# Patient Record
Sex: MALE | Race: WHITE | Employment: FULL TIME | URBAN - METROPOLITAN AREA
[De-identification: names, ages, dates, MRNs, and addresses within clinical notes are randomized per-mention and may not be internally consistent; named-entity substitution may affect disease eponyms.]

---

## 2017-01-25 ENCOUNTER — HOSPITAL ENCOUNTER (OUTPATIENT)
Dept: PHYSICAL THERAPY | Age: 27
Discharge: HOME | End: 2017-01-31
Attending: FAMILY MEDICINE | Admitting: FAMILY MEDICINE

## 2017-01-25 ASSESSMENT — PAIN DESCRIPTION - PAIN TYPE: TYPE: CHRONIC PAIN

## 2017-01-25 ASSESSMENT — PAIN SCALES - GENERAL: PAINLEVEL_OUTOF10: 2

## 2017-01-25 ASSESSMENT — PAIN DESCRIPTION - FREQUENCY: FREQUENCY: CONTINUOUS

## 2017-01-25 ASSESSMENT — PAIN DESCRIPTION - DESCRIPTORS: DESCRIPTORS: ACHING

## 2017-01-25 ASSESSMENT — PAIN DESCRIPTION - LOCATION: LOCATION: BACK

## 2017-01-25 ASSESSMENT — PAIN DESCRIPTION - ORIENTATION: ORIENTATION: LEFT;RIGHT

## 2017-02-01 ENCOUNTER — HOSPITAL ENCOUNTER (OUTPATIENT)
Dept: OTHER | Age: 27
Discharge: HOME | End: 2017-02-28
Attending: FAMILY MEDICINE | Admitting: FAMILY MEDICINE

## 2017-02-14 ENCOUNTER — HOSPITAL ENCOUNTER (OUTPATIENT)
Dept: GENERAL RADIOLOGY | Age: 27
Discharge: HOME | End: 2017-02-14
Attending: FAMILY MEDICINE | Admitting: FAMILY MEDICINE

## 2017-02-14 DIAGNOSIS — M25.561 RIGHT KNEE PAIN, UNSPECIFIED CHRONICITY: ICD-10-CM

## 2017-04-25 PROBLEM — E78.1 HYPERTRIGLYCERIDEMIA: Status: ACTIVE | Noted: 2017-04-25

## 2017-08-11 ENCOUNTER — HOSPITAL ENCOUNTER (OUTPATIENT)
Dept: GENERAL RADIOLOGY | Age: 27
Discharge: HOME | End: 2017-08-11
Attending: FAMILY MEDICINE | Admitting: FAMILY MEDICINE

## 2017-08-11 DIAGNOSIS — M19.90 ACUTE ARTHRITIS: ICD-10-CM

## 2017-08-11 LAB
ALBUMIN SERPL-MCNC: 4.6 GM/DL (ref 3.4–5)
ALP BLD-CCNC: 81 IU/L (ref 40–129)
ALT SERPL-CCNC: 77 U/L (ref 10–40)
AST SERPL-CCNC: 37 IU/L (ref 15–37)
BILIRUB SERPL-MCNC: 0.5 MG/DL (ref 0–1)
BILIRUBIN DIRECT: 0.2 MG/DL (ref 0–0.3)
BILIRUBIN, INDIRECT: 0.3 MG/DL (ref 0–0.7)
TOTAL PROTEIN: 7.6 GM/DL (ref 6.4–8.2)

## 2018-03-22 ENCOUNTER — HOSPITAL ENCOUNTER (OUTPATIENT)
Dept: GENERAL RADIOLOGY | Age: 28
Discharge: HOME | End: 2018-03-22
Attending: FAMILY MEDICINE | Admitting: FAMILY MEDICINE

## 2018-03-22 DIAGNOSIS — M54.5 LOW BACK PAIN, UNSPECIFIED BACK PAIN LATERALITY, UNSPECIFIED CHRONICITY, WITH SCIATICA PRESENCE UNSPECIFIED: ICD-10-CM

## 2018-03-22 LAB
ALBUMIN SERPL-MCNC: 4.6 GM/DL (ref 3.4–5)
ALBUMIN SERPL-MCNC: 4.6 GM/DL (ref 3.4–5)
ALP BLD-CCNC: 100 IU/L (ref 40–128)
ALP BLD-CCNC: 100 IU/L (ref 40–129)
ALT SERPL-CCNC: 59 U/L (ref 10–40)
ALT SERPL-CCNC: 59 U/L (ref 10–40)
ANION GAP SERPL CALCULATED.3IONS-SCNC: 14 MMOL/L (ref 4–16)
AST SERPL-CCNC: 35 IU/L (ref 15–37)
AST SERPL-CCNC: 35 IU/L (ref 15–37)
BACTERIA: ABNORMAL /HPF
BILIRUB SERPL-MCNC: 0.5 MG/DL (ref 0–1)
BILIRUB SERPL-MCNC: 0.5 MG/DL (ref 0–1)
BILIRUBIN DIRECT: 0.2 MG/DL (ref 0–0.3)
BILIRUBIN URINE: NEGATIVE MG/DL
BILIRUBIN, INDIRECT: 0.3 MG/DL (ref 0–0.7)
BLOOD, URINE: NEGATIVE
BUN BLDV-MCNC: 8 MG/DL (ref 6–23)
CALCIUM SERPL-MCNC: 9.6 MG/DL (ref 8.3–10.6)
CHLORIDE BLD-SCNC: 99 MMOL/L (ref 99–110)
CHOLESTEROL: 135 MG/DL
CLARITY: CLEAR
CO2: 24 MMOL/L (ref 21–32)
COLOR: ABNORMAL
CREAT SERPL-MCNC: 1.2 MG/DL (ref 0.9–1.3)
GFR AFRICAN AMERICAN: >60 ML/MIN/1.73M2
GFR NON-AFRICAN AMERICAN: >60 ML/MIN/1.73M2
GLUCOSE FASTING: 131 MG/DL (ref 70–99)
GLUCOSE, URINE: NEGATIVE MG/DL
HCT VFR BLD CALC: 48 % (ref 42–52)
HDLC SERPL-MCNC: 33 MG/DL
HEMOGLOBIN: 17.2 GM/DL (ref 13.5–18)
KETONES, URINE: NEGATIVE MG/DL
LDL CHOLESTEROL DIRECT: 86 MG/DL
LEUKOCYTE ESTERASE, URINE: NEGATIVE
MAGNESIUM: 1.9 MG/DL (ref 1.8–2.4)
MCH RBC QN AUTO: 31.7 PG (ref 27–31)
MCHC RBC AUTO-ENTMCNC: 35.8 % (ref 32–36)
MCV RBC AUTO: 88.4 FL (ref 78–100)
NITRITE URINE, QUANTITATIVE: NEGATIVE
PDW BLD-RTO: 12.8 % (ref 11.7–14.9)
PH, URINE: 6 (ref 5–8)
PLATELET # BLD: 293 K/CU MM (ref 140–440)
PMV BLD AUTO: 10.3 FL (ref 7.5–11.1)
POTASSIUM SERPL-SCNC: 4.3 MMOL/L (ref 3.5–5.1)
PROTEIN UA: NEGATIVE MG/DL
RBC # BLD: 5.43 M/CU MM (ref 4.6–6.2)
RBC URINE: 1 /HPF (ref 0–3)
SODIUM BLD-SCNC: 137 MMOL/L (ref 135–145)
SPECIFIC GRAVITY UA: 1.01 (ref 1–1.03)
SQUAMOUS EPITHELIAL: <1 /HPF
T4 FREE: 1.38 NG/DL (ref 0.9–1.8)
TOTAL PROTEIN: 7.7 GM/DL (ref 6.4–8.2)
TOTAL PROTEIN: 7.7 GM/DL (ref 6.4–8.2)
TRICHOMONAS: ABNORMAL /HPF
TRIGL SERPL-MCNC: 166 MG/DL
TSH HIGH SENSITIVITY: 2.23 UIU/ML (ref 0.27–4.2)
UROBILINOGEN, URINE: NORMAL MG/DL (ref 0.2–1)
VITAMIN D 25-HYDROXY: 24.73 NG/ML
WBC # BLD: 10.7 K/CU MM (ref 4–10.5)
WBC UA: 2 /HPF (ref 0–2)

## 2018-03-23 LAB
ESTIMATED AVERAGE GLUCOSE: 117 MG/DL
HBA1C MFR BLD: 5.7 % (ref 4.2–6.3)

## 2018-05-01 ENCOUNTER — HOSPITAL ENCOUNTER (OUTPATIENT)
Dept: GENERAL RADIOLOGY | Age: 28
Discharge: HOME | End: 2018-05-01
Attending: FAMILY MEDICINE | Admitting: FAMILY MEDICINE

## 2018-05-01 DIAGNOSIS — R10.11 ABDOMINAL PAIN, RIGHT UPPER QUADRANT: ICD-10-CM

## 2018-05-04 ENCOUNTER — HOSPITAL ENCOUNTER (OUTPATIENT)
Dept: NUCLEAR MEDICINE | Age: 28
Discharge: HOME | End: 2018-05-04
Attending: FAMILY MEDICINE | Admitting: FAMILY MEDICINE

## 2018-05-04 VITALS — BODY MASS INDEX: 39.8 KG/M2 | WEIGHT: 310 LBS

## 2018-05-04 DIAGNOSIS — R10.11 RUQ PAIN: ICD-10-CM

## 2018-05-04 RX ADMIN — Medication 6 MILLICURIE: at 09:00

## 2018-05-15 ENCOUNTER — TELEPHONE (OUTPATIENT)
Dept: SURGERY | Age: 28
End: 2018-05-15

## 2018-05-24 ENCOUNTER — OFFICE VISIT (OUTPATIENT)
Dept: SURGERY | Age: 28
End: 2018-05-24

## 2018-05-24 VITALS
BODY MASS INDEX: 39.78 KG/M2 | SYSTOLIC BLOOD PRESSURE: 112 MMHG | WEIGHT: 309.97 LBS | HEIGHT: 74 IN | HEART RATE: 85 BPM | DIASTOLIC BLOOD PRESSURE: 84 MMHG

## 2018-05-24 DIAGNOSIS — K80.10 CCC (CHRONIC CALCULOUS CHOLECYSTITIS): Primary | ICD-10-CM

## 2018-05-24 PROCEDURE — G8427 DOCREV CUR MEDS BY ELIG CLIN: HCPCS | Performed by: SURGERY

## 2018-05-24 PROCEDURE — 4004F PT TOBACCO SCREEN RCVD TLK: CPT | Performed by: SURGERY

## 2018-05-24 PROCEDURE — 99203 OFFICE O/P NEW LOW 30 MIN: CPT | Performed by: SURGERY

## 2018-05-24 PROCEDURE — G8417 CALC BMI ABV UP PARAM F/U: HCPCS | Performed by: SURGERY

## 2018-05-24 RX ORDER — TOPIRAMATE 25 MG/1
TABLET ORAL 2 TIMES DAILY
COMMUNITY
Start: 2018-02-19 | End: 2020-07-28 | Stop reason: SDUPTHER

## 2018-05-24 RX ORDER — DOCUSATE SODIUM 100 MG/1
100 CAPSULE, LIQUID FILLED ORAL 2 TIMES DAILY
COMMUNITY
End: 2019-04-03

## 2018-05-24 RX ORDER — GEMFIBROZIL 600 MG/1
TABLET, FILM COATED ORAL 2 TIMES DAILY
COMMUNITY
Start: 2018-05-07 | End: 2019-04-03

## 2018-05-24 RX ORDER — DIPHENHYDRAMINE HCL 25 MG
50 TABLET ORAL EVERY 6 HOURS PRN
COMMUNITY
End: 2021-02-02

## 2018-05-24 RX ORDER — IBUPROFEN 800 MG/1
TABLET ORAL
COMMUNITY
Start: 2018-02-19 | End: 2019-09-30

## 2018-05-24 ASSESSMENT — ENCOUNTER SYMPTOMS
APNEA: 0
BLOOD IN STOOL: 1
COLOR CHANGE: 0
EYE REDNESS: 0
STRIDOR: 0
PHOTOPHOBIA: 0
ABDOMINAL PAIN: 1
RECTAL PAIN: 0
BACK PAIN: 0
EYE ITCHING: 0
CHOKING: 0
CONSTIPATION: 0
SORE THROAT: 0
ANAL BLEEDING: 0

## 2018-05-25 ENCOUNTER — TELEPHONE (OUTPATIENT)
Dept: SURGERY | Age: 28
End: 2018-05-25

## 2018-06-07 ENCOUNTER — HOSPITAL ENCOUNTER (OUTPATIENT)
Dept: GENERAL RADIOLOGY | Age: 28
Discharge: HOME | End: 2018-06-07
Attending: FAMILY MEDICINE | Admitting: FAMILY MEDICINE

## 2018-06-07 LAB
ALBUMIN SERPL-MCNC: 4.6 GM/DL (ref 3.4–5)
ALP BLD-CCNC: 84 IU/L (ref 40–128)
ALT SERPL-CCNC: 61 U/L (ref 10–40)
ANION GAP SERPL CALCULATED.3IONS-SCNC: 16 MMOL/L (ref 4–16)
AST SERPL-CCNC: 41 IU/L (ref 15–37)
BACTERIA: NEGATIVE /HPF
BILIRUB SERPL-MCNC: 0.7 MG/DL (ref 0–1)
BILIRUBIN URINE: NEGATIVE MG/DL
BLOOD, URINE: NEGATIVE
BUN BLDV-MCNC: 11 MG/DL (ref 6–23)
CALCIUM SERPL-MCNC: 9.4 MG/DL (ref 8.3–10.6)
CHLORIDE BLD-SCNC: 99 MMOL/L (ref 99–110)
CHOLESTEROL: 125 MG/DL
CLARITY: CLEAR
CO2: 22 MMOL/L (ref 21–32)
COLOR: YELLOW
CREAT SERPL-MCNC: 1.2 MG/DL (ref 0.9–1.3)
ERYTHROCYTE SEDIMENTATION RATE: 19 MM/HR (ref 0–15)
ESTIMATED AVERAGE GLUCOSE: 108 MG/DL
GFR AFRICAN AMERICAN: >60 ML/MIN/1.73M2
GFR NON-AFRICAN AMERICAN: >60 ML/MIN/1.73M2
GLUCOSE FASTING: 93 MG/DL (ref 70–99)
GLUCOSE, URINE: NEGATIVE MG/DL
HBA1C MFR BLD: 5.4 % (ref 4.2–6.3)
HCT VFR BLD CALC: 48.5 % (ref 42–52)
HDLC SERPL-MCNC: 33 MG/DL
HEMOGLOBIN: 16.4 GM/DL (ref 13.5–18)
KETONES, URINE: NEGATIVE MG/DL
LDL CHOLESTEROL DIRECT: 85 MG/DL
LEUKOCYTE ESTERASE, URINE: NEGATIVE
MCH RBC QN AUTO: 31.2 PG (ref 27–31)
MCHC RBC AUTO-ENTMCNC: 33.8 % (ref 32–36)
MCV RBC AUTO: 92.4 FL (ref 78–100)
MUCUS: ABNORMAL HPF
NITRITE URINE, QUANTITATIVE: NEGATIVE
PDW BLD-RTO: 12.9 % (ref 11.7–14.9)
PH, URINE: 5 (ref 5–8)
PLATELET # BLD: 302 K/CU MM (ref 140–440)
PMV BLD AUTO: 11 FL (ref 7.5–11.1)
POTASSIUM SERPL-SCNC: 4.6 MMOL/L (ref 3.5–5.1)
PROTEIN UA: NEGATIVE MG/DL
RBC # BLD: 5.25 M/CU MM (ref 4.6–6.2)
RBC URINE: ABNORMAL /HPF (ref 0–3)
SODIUM BLD-SCNC: 137 MMOL/L (ref 135–145)
SPECIFIC GRAVITY UA: 1.01 (ref 1–1.03)
SQUAMOUS EPITHELIAL: <1 /HPF
T4 FREE: 1.34 NG/DL (ref 0.9–1.8)
TOTAL PROTEIN: 7.9 GM/DL (ref 6.4–8.2)
TRICHOMONAS: ABNORMAL /HPF
TRIGL SERPL-MCNC: 91 MG/DL
TSH HIGH SENSITIVITY: 2.32 UIU/ML (ref 0.27–4.2)
URIC ACID: 6.9 MG/DL (ref 3.5–7.2)
UROBILINOGEN, URINE: NORMAL MG/DL (ref 0.2–1)
VITAMIN D 25-HYDROXY: 39.65 NG/ML
WBC # BLD: 9.3 K/CU MM (ref 4–10.5)
WBC UA: 1 /HPF (ref 0–2)

## 2018-06-25 ENCOUNTER — OFFICE VISIT (OUTPATIENT)
Dept: SURGERY | Age: 28
End: 2018-06-25

## 2018-06-25 VITALS
HEIGHT: 74 IN | HEART RATE: 90 BPM | SYSTOLIC BLOOD PRESSURE: 124 MMHG | WEIGHT: 310 LBS | DIASTOLIC BLOOD PRESSURE: 76 MMHG | BODY MASS INDEX: 39.78 KG/M2

## 2018-06-25 DIAGNOSIS — K80.10 CCC (CHRONIC CALCULOUS CHOLECYSTITIS): Primary | ICD-10-CM

## 2018-06-25 PROCEDURE — 99024 POSTOP FOLLOW-UP VISIT: CPT | Performed by: PHYSICIAN ASSISTANT

## 2018-06-25 RX ORDER — DICYCLOMINE HCL 20 MG
TABLET ORAL
COMMUNITY
Start: 2018-06-20 | End: 2018-10-01

## 2018-06-25 RX ORDER — FLUTICASONE PROPIONATE 50 MCG
SPRAY, SUSPENSION (ML) NASAL
COMMUNITY
Start: 2018-06-20 | End: 2018-10-01

## 2018-06-25 RX ORDER — MONTELUKAST SODIUM 10 MG/1
TABLET ORAL
COMMUNITY
Start: 2018-06-20 | End: 2019-04-03

## 2018-06-25 RX ORDER — PANTOPRAZOLE SODIUM 40 MG/1
TABLET, DELAYED RELEASE ORAL
COMMUNITY
Start: 2018-06-20 | End: 2019-04-03 | Stop reason: SDUPTHER

## 2018-07-09 ENCOUNTER — OFFICE VISIT (OUTPATIENT)
Dept: SURGERY | Age: 28
End: 2018-07-09

## 2018-07-09 VITALS — SYSTOLIC BLOOD PRESSURE: 120 MMHG | HEART RATE: 89 BPM | DIASTOLIC BLOOD PRESSURE: 87 MMHG

## 2018-07-09 DIAGNOSIS — K80.10 CCC (CHRONIC CALCULOUS CHOLECYSTITIS): Primary | ICD-10-CM

## 2018-07-09 PROCEDURE — 99024 POSTOP FOLLOW-UP VISIT: CPT | Performed by: PHYSICIAN ASSISTANT

## 2018-07-09 RX ORDER — SULFAMETHOXAZOLE AND TRIMETHOPRIM 800; 160 MG/1; MG/1
1 TABLET ORAL 2 TIMES DAILY
Qty: 20 TABLET | Refills: 0 | Status: SHIPPED | OUTPATIENT
Start: 2018-07-09 | End: 2018-07-19

## 2018-07-20 ENCOUNTER — HOSPITAL ENCOUNTER (OUTPATIENT)
Dept: GENERAL RADIOLOGY | Age: 28
Discharge: HOME | End: 2018-07-20
Admitting: FAMILY MEDICINE

## 2018-07-20 DIAGNOSIS — M25.561 RIGHT KNEE PAIN, UNSPECIFIED CHRONICITY: ICD-10-CM

## 2018-07-20 DIAGNOSIS — M54.40 ACUTE RIGHT-SIDED LOW BACK PAIN WITH SCIATICA, SCIATICA LATERALITY UNSPECIFIED: ICD-10-CM

## 2018-07-26 ENCOUNTER — OFFICE VISIT (OUTPATIENT)
Dept: SURGERY | Age: 28
End: 2018-07-26

## 2018-07-26 VITALS
DIASTOLIC BLOOD PRESSURE: 78 MMHG | BODY MASS INDEX: 39.78 KG/M2 | SYSTOLIC BLOOD PRESSURE: 122 MMHG | HEIGHT: 74 IN | WEIGHT: 310 LBS | HEART RATE: 80 BPM

## 2018-07-26 DIAGNOSIS — K80.10 CCC (CHRONIC CALCULOUS CHOLECYSTITIS): Primary | ICD-10-CM

## 2018-07-26 PROCEDURE — 99024 POSTOP FOLLOW-UP VISIT: CPT | Performed by: PHYSICIAN ASSISTANT

## 2018-07-26 NOTE — PROGRESS NOTES
Abdomen soft, nontender, nondistended. ASSESSMENT:  Patient doing well on this post operative check. Wounds well healed. 1. CCC (chronic calculous cholecystitis)    2. Infection involving stitch with abscess, initial encounter      PLAN:  Continue same  Increase activity as tolerated  Wound clear, healed  F/U PRN  No orders of the defined types were placed in this encounter. No orders of the defined types were placed in this encounter. Follow Up: Return if symptoms worsen or fail to improve.   Anil Nolasco PA-C

## 2019-02-15 ENCOUNTER — OFFICE VISIT (OUTPATIENT)
Dept: FAMILY MEDICINE CLINIC | Age: 29
End: 2019-02-15
Payer: COMMERCIAL

## 2019-02-15 VITALS
BODY MASS INDEX: 40.34 KG/M2 | OXYGEN SATURATION: 99 % | DIASTOLIC BLOOD PRESSURE: 72 MMHG | WEIGHT: 304.4 LBS | SYSTOLIC BLOOD PRESSURE: 118 MMHG | HEART RATE: 83 BPM | TEMPERATURE: 97.8 F | HEIGHT: 73 IN

## 2019-02-15 DIAGNOSIS — M54.41 LEFT-SIDED LOW BACK PAIN WITH RIGHT-SIDED SCIATICA, UNSPECIFIED CHRONICITY: Primary | ICD-10-CM

## 2019-02-15 PROCEDURE — 99213 OFFICE O/P EST LOW 20 MIN: CPT | Performed by: NURSE PRACTITIONER

## 2019-02-15 PROCEDURE — 96372 THER/PROPH/DIAG INJ SC/IM: CPT | Performed by: NURSE PRACTITIONER

## 2019-02-15 RX ORDER — KETOROLAC TROMETHAMINE 30 MG/ML
60 INJECTION, SOLUTION INTRAMUSCULAR; INTRAVENOUS ONCE
Status: COMPLETED | OUTPATIENT
Start: 2019-02-15 | End: 2019-02-15

## 2019-02-15 RX ORDER — NAPROXEN 500 MG/1
500 TABLET ORAL 2 TIMES DAILY WITH MEALS
Qty: 60 TABLET | Refills: 0 | Status: SHIPPED | OUTPATIENT
Start: 2019-02-15 | End: 2019-08-21

## 2019-02-15 RX ADMIN — KETOROLAC TROMETHAMINE 60 MG: 30 INJECTION, SOLUTION INTRAMUSCULAR; INTRAVENOUS at 17:15

## 2019-02-15 ASSESSMENT — ENCOUNTER SYMPTOMS: BACK PAIN: 1

## 2019-02-16 ASSESSMENT — ENCOUNTER SYMPTOMS
SHORTNESS OF BREATH: 0
CONSTIPATION: 1
NAUSEA: 0
WHEEZING: 0
COUGH: 0
DIARRHEA: 0
CHEST TIGHTNESS: 0
ABDOMINAL PAIN: 0

## 2019-04-03 ENCOUNTER — OFFICE VISIT (OUTPATIENT)
Dept: FAMILY MEDICINE CLINIC | Age: 29
End: 2019-04-03
Payer: COMMERCIAL

## 2019-04-03 VITALS
OXYGEN SATURATION: 97 % | DIASTOLIC BLOOD PRESSURE: 70 MMHG | HEART RATE: 97 BPM | HEIGHT: 73 IN | SYSTOLIC BLOOD PRESSURE: 115 MMHG | WEIGHT: 304 LBS | BODY MASS INDEX: 40.29 KG/M2

## 2019-04-03 DIAGNOSIS — E66.01 OBESITY, MORBID, BMI 40.0-49.9 (HCC): ICD-10-CM

## 2019-04-03 DIAGNOSIS — K21.9 GASTROESOPHAGEAL REFLUX DISEASE WITHOUT ESOPHAGITIS: ICD-10-CM

## 2019-04-03 DIAGNOSIS — Z23 NEED FOR VACCINATION FOR PNEUMOCOCCUS: ICD-10-CM

## 2019-04-03 DIAGNOSIS — I10 ESSENTIAL HYPERTENSION: Primary | ICD-10-CM

## 2019-04-03 DIAGNOSIS — G89.29 CHRONIC MIDLINE LOW BACK PAIN WITH SCIATICA, SCIATICA LATERALITY UNSPECIFIED: ICD-10-CM

## 2019-04-03 DIAGNOSIS — M54.40 CHRONIC MIDLINE LOW BACK PAIN WITH SCIATICA, SCIATICA LATERALITY UNSPECIFIED: ICD-10-CM

## 2019-04-03 DIAGNOSIS — E78.1 HYPERTRIGLYCERIDEMIA: ICD-10-CM

## 2019-04-03 PROCEDURE — 90732 PPSV23 VACC 2 YRS+ SUBQ/IM: CPT | Performed by: FAMILY MEDICINE

## 2019-04-03 PROCEDURE — 99214 OFFICE O/P EST MOD 30 MIN: CPT | Performed by: FAMILY MEDICINE

## 2019-04-03 PROCEDURE — 90471 IMMUNIZATION ADMIN: CPT | Performed by: FAMILY MEDICINE

## 2019-04-03 RX ORDER — GEMFIBROZIL 600 MG/1
TABLET, FILM COATED ORAL 2 TIMES DAILY
Status: CANCELLED | OUTPATIENT
Start: 2019-04-03

## 2019-04-03 RX ORDER — PANTOPRAZOLE SODIUM 40 MG/1
40 TABLET, DELAYED RELEASE ORAL DAILY
Qty: 30 TABLET | Refills: 5 | Status: SHIPPED | OUTPATIENT
Start: 2019-04-03 | End: 2019-11-15 | Stop reason: SDUPTHER

## 2019-04-03 ASSESSMENT — ENCOUNTER SYMPTOMS
ABDOMINAL PAIN: 0
DIARRHEA: 0
SORE THROAT: 0
SINUS PRESSURE: 0
BACK PAIN: 1
SHORTNESS OF BREATH: 0
WHEEZING: 0
CHEST TIGHTNESS: 0
COUGH: 0
CONSTIPATION: 0

## 2019-04-03 NOTE — PATIENT INSTRUCTIONS
please click on the \"Sign Up Now\" link. Current as of: September 20, 2018  Content Version: 11.9  © 2150-2395 [employer]. Care instructions adapted under license by [facility] ([facility]). If you have questions about a medical condition or this instruction, always ask your healthcare professional. [address] any warranty or liability for your use of this information. Patient Education        Low Sodium Diet (2,000 Milligram): Care Instructions  Your Care Instructions    Too much sodium causes your body to hold on to extra water. This can raise your blood pressure and force your heart and kidneys to work harder. In very serious cases, this could cause you to be put in the hospital. It might even be life-threatening. By limiting sodium, you will feel better and lower your risk of serious problems. The most common source of sodium is salt. People get most of the salt in their diet from canned, prepared, and packaged foods. Fast food and restaurant meals also are very high in sodium. Your doctor will probably limit your sodium to less than 2,000 milligrams (mg) a day. This limit counts all the sodium in prepared and packaged foods and any salt you add to your food. Follow-up care is a key part of your treatment and safety. Be sure to make and go to all appointments, and call your doctor if you are having problems. It's also a good idea to know your test results and keep a list of the medicines you take. How can you care for yourself at home? Read food labels  · Read labels on cans and food packages. The labels tell you how much sodium is in each serving. Make sure that you look at the serving size. If you eat more than the serving size, you have eaten more sodium. · Food labels also tell you the Percent Daily Value for sodium. Choose products with low Percent Daily Values for sodium.   · Be aware that sodium can come in forms other than salt, including monosodium glutamate (MSG), and processed cheese and regular peanut butter. ? Crackers with salted tops, and other salted snack foods such as pretzels, chips, and salted popcorn. ? Frozen prepared meals, unless labeled low-sodium. ? Canned and dried soups, broths, and bouillon, unless labeled sodium-free or low-sodium. ? Canned vegetables, unless labeled sodium-free or low-sodium. ? Western [employer] fries, pizza, tacos, and other fast foods. ? Pickles, olives, ketchup, and other condiments, especially soy sauce, unless labeled sodium-free or low-sodium. Where can you learn more? Go to https://Quick TVpePromisePayeb.Openbucks. org and sign in to your [employer] account. Enter Z668 in the [facility] EDI box to learn more about \"Low Sodium Diet (2,000 Milligram): Care Instructions. \"     If you do not have an account, please click on the \"Sign Up Now\" link. Current as of: March 28, 2018  Content Version: 11.9  © 9010-3262 [employer]. Care instructions adapted under license by [facility] ([facility]). If you have questions about a medical condition or this instruction, always ask your healthcare professional. [address] any warranty or liability for your use of this information.

## 2019-04-03 NOTE — PROGRESS NOTES
Johnna Flores  1990 04/03/19    Chief Complaint   Patient presents with    New Patient     Patient needs to establish care PCP           34 yrs old man with PMH of HTN, HLD, GERD, chronic back pain, and migraine, came today to establish with us, patient needs medications refill, doing okay no new concerns.        Past Medical History:   Diagnosis Date    Depression 5/31/2016    Essential hypertension 5/31/2016    Hypertriglyceridemia 4/25/2017     Past Surgical History:   Procedure Laterality Date    CHOLECYSTECTOMY, LAPAROSCOPIC  06/12/2018     Family History   Problem Relation Age of Onset    Diabetes Maternal Uncle     Stroke Maternal Uncle     Cancer Maternal Grandmother     Heart Disease Maternal Grandmother     Stroke Maternal Grandmother     Stroke Maternal Grandfather      Social History     Socioeconomic History    Marital status:      Spouse name: Not on file    Number of children: Not on file    Years of education: Not on file    Highest education level: Not on file   Occupational History    Not on file   Social Needs    Financial resource strain: Not on file    Food insecurity:     Worry: Not on file     Inability: Not on file    Transportation needs:     Medical: Not on file     Non-medical: Not on file   Tobacco Use    Smoking status: Current Every Day Smoker     Packs/day: 1.00     Years: 9.00     Pack years: 9.00     Types: E-Cigarettes    Smokeless tobacco: Never Used    Tobacco comment: smokes vape   Substance and Sexual Activity    Alcohol use: No    Drug use: No    Sexual activity: Yes     Partners: Female   Lifestyle    Physical activity:     Days per week: Not on file     Minutes per session: Not on file    Stress: Not on file   Relationships    Social connections:     Talks on phone: Not on file     Gets together: Not on file     Attends Mu-ism service: Not on file     Active member of club or organization: Not on file     Attends meetings of clubs or organizations: Not on file     Relationship status: Not on file    Intimate partner violence:     Fear of current or ex partner: Not on file     Emotionally abused: Not on file     Physically abused: Not on file     Forced sexual activity: Not on file   Other Topics Concern    Not on file   Social History Narrative    Not on file       Allergies   Allergen Reactions    Tape Venegas Bills Tape] Rash     And tegaderms     Current Outpatient Medications   Medication Sig Dispense Refill    pantoprazole (PROTONIX) 40 MG tablet Take 1 tablet by mouth daily 30 tablet 5    naproxen (NAPROSYN) 500 MG tablet Take 1 tablet by mouth 2 times daily (with meals) 60 tablet 0    metoprolol succinate (TOPROL XL) 50 MG extended release tablet Take 1 tablet by mouth daily 90 tablet 3    lisinopril (PRINIVIL;ZESTRIL) 20 MG tablet Take 1 tablet by mouth daily 90 tablet 3    cloNIDine (CATAPRES) 0.1 MG tablet TAKE ONE TABLET BY MOUTH TWICE DAILY 180 tablet 3    diphenhydrAMINE (BENADRYL) 25 MG tablet Take 50 mg by mouth every 6 hours as needed for Itching (nightly, sleep)      topiramate (TOPAMAX) 25 MG tablet 2 times daily       ibuprofen (ADVIL;MOTRIN) 800 MG tablet       cyclobenzaprine (FLEXERIL) 10 MG tablet Take 10 mg by mouth 2 times daily as needed for Muscle spasms      Multiple Vitamins-Minerals (MULTIVITAL) CHEW Take by mouth daily       No current facility-administered medications for this visit. Review of Systems   Constitutional: Negative for activity change, appetite change, chills, fatigue and fever. HENT: Negative for congestion, postnasal drip, sinus pressure and sore throat. Respiratory: Negative for cough, chest tightness, shortness of breath and wheezing. Cardiovascular: Negative for chest pain and leg swelling. Gastrointestinal: Negative for abdominal pain, constipation and diarrhea. Genitourinary: Negative for dysuria and frequency. Musculoskeletal: Positive for back pain. Negative for gait problem. Skin: Negative for rash. Neurological: Negative for dizziness, weakness and headaches. Psychiatric/Behavioral: Negative for agitation and behavioral problems. The patient is not nervous/anxious. Lab Results   Component Value Date    WBC 7.2 09/28/2018    HGB 15.6 09/28/2018    HCT 45.4 09/28/2018    MCV 93.4 09/28/2018     09/28/2018     Lab Results   Component Value Date     09/28/2018    K 4.6 09/28/2018     09/28/2018    CO2 22 09/28/2018    BUN 6 (L) 09/28/2018    CREATININE 0.9 09/28/2018    GLUCOSE 131 (H) 09/28/2018    CALCIUM 9.6 09/28/2018    PROT 7.9 06/07/2018    LABALBU 4.8 09/28/2018    BILITOT 0.7 06/07/2018    ALKPHOS 84 06/07/2018    AST 41 (H) 06/07/2018    ALT 61 (H) 06/07/2018    LABGLOM >60 09/28/2018    GFRAA >60 09/28/2018     Lab Results   Component Value Date    CHOL 125 06/07/2018    CHOL 135 03/22/2018    CHOL 133 04/22/2017     Lab Results   Component Value Date    TRIG 91 06/07/2018    TRIG 166 (H) 03/22/2018    TRIG 388 (H) 04/22/2017     Lab Results   Component Value Date    HDL 33 (L) 06/07/2018    HDL 33 (L) 03/22/2018    HDL 23 (L) 04/22/2017     Lab Results   Component Value Date    LDLCALC 32 04/22/2017     Lab Results   Component Value Date    LABA1C 5.1 09/28/2018     Lab Results   Component Value Date    TSHHS 2.320 06/07/2018         /70 (Site: Right Upper Arm, Position: Sitting, Cuff Size: Large Adult)   Pulse 97   Ht 6' 1\" (1.854 m)   Wt (!) 304 lb (137.9 kg)   SpO2 97%   BMI 40.11 kg/m²     BP Readings from Last 3 Encounters:   04/03/19 115/70   02/15/19 118/72   10/01/18 138/78       Wt Readings from Last 3 Encounters:   04/03/19 (!) 304 lb (137.9 kg)   02/15/19 (!) 304 lb 6.4 oz (138.1 kg)   10/01/18 296 lb (134.3 kg)         Physical Exam   Constitutional: He is oriented to person, place, and time. He appears well-developed and well-nourished. No distress. HENT:   Head: Normocephalic and atraumatic.

## 2019-06-05 ENCOUNTER — APPOINTMENT (OUTPATIENT)
Dept: GENERAL RADIOLOGY | Age: 29
End: 2019-06-05
Payer: COMMERCIAL

## 2019-06-05 ENCOUNTER — HOSPITAL ENCOUNTER (EMERGENCY)
Age: 29
Discharge: HOME | End: 2019-06-05
Payer: COMMERCIAL

## 2019-06-05 PROCEDURE — 4500000002 HC ER NO CHARGE

## 2019-07-19 ENCOUNTER — PATIENT MESSAGE (OUTPATIENT)
Dept: FAMILY MEDICINE CLINIC | Age: 29
End: 2019-07-19

## 2019-07-31 ENCOUNTER — PATIENT MESSAGE (OUTPATIENT)
Dept: FAMILY MEDICINE CLINIC | Age: 29
End: 2019-07-31

## 2019-08-02 ENCOUNTER — HOSPITAL ENCOUNTER (EMERGENCY)
Age: 29
Discharge: HOME | End: 2019-08-02
Payer: COMMERCIAL

## 2019-08-02 VITALS
BODY MASS INDEX: 40.29 KG/M2 | HEIGHT: 73 IN | DIASTOLIC BLOOD PRESSURE: 89 MMHG | WEIGHT: 304 LBS | RESPIRATION RATE: 15 BRPM | TEMPERATURE: 98.2 F | SYSTOLIC BLOOD PRESSURE: 132 MMHG | OXYGEN SATURATION: 100 % | HEART RATE: 81 BPM

## 2019-08-02 DIAGNOSIS — M25.511 ACUTE PAIN OF BOTH SHOULDERS: Primary | ICD-10-CM

## 2019-08-02 DIAGNOSIS — M25.512 ACUTE PAIN OF BOTH SHOULDERS: Primary | ICD-10-CM

## 2019-08-02 PROCEDURE — 99282 EMERGENCY DEPT VISIT SF MDM: CPT

## 2019-08-02 RX ORDER — METHYLPREDNISOLONE 4 MG/1
TABLET ORAL
Qty: 1 KIT | Refills: 0 | Status: SHIPPED | OUTPATIENT
Start: 2019-08-02 | End: 2019-08-21

## 2019-08-02 RX ORDER — METAXALONE 800 MG/1
800 TABLET ORAL 3 TIMES DAILY PRN
Qty: 15 TABLET | Refills: 0 | Status: SHIPPED | OUTPATIENT
Start: 2019-08-02 | End: 2019-08-12

## 2019-08-02 ASSESSMENT — PAIN SCALES - GENERAL: PAINLEVEL_OUTOF10: 6

## 2019-08-02 ASSESSMENT — PAIN DESCRIPTION - LOCATION: LOCATION: SHOULDER

## 2019-08-02 ASSESSMENT — PAIN DESCRIPTION - ORIENTATION: ORIENTATION: RIGHT;LEFT

## 2019-08-02 ASSESSMENT — PAIN DESCRIPTION - PAIN TYPE: TYPE: ACUTE PAIN

## 2019-08-21 ENCOUNTER — OFFICE VISIT (OUTPATIENT)
Dept: FAMILY MEDICINE CLINIC | Age: 29
End: 2019-08-21
Payer: COMMERCIAL

## 2019-08-21 VITALS
OXYGEN SATURATION: 96 % | HEART RATE: 70 BPM | SYSTOLIC BLOOD PRESSURE: 122 MMHG | WEIGHT: 314 LBS | HEIGHT: 73 IN | BODY MASS INDEX: 41.62 KG/M2 | DIASTOLIC BLOOD PRESSURE: 84 MMHG

## 2019-08-21 DIAGNOSIS — M79.641 PAIN IN BOTH HANDS: Primary | ICD-10-CM

## 2019-08-21 DIAGNOSIS — M25.511 BILATERAL SHOULDER PAIN, UNSPECIFIED CHRONICITY: ICD-10-CM

## 2019-08-21 DIAGNOSIS — M25.512 BILATERAL SHOULDER PAIN, UNSPECIFIED CHRONICITY: ICD-10-CM

## 2019-08-21 DIAGNOSIS — M79.642 PAIN IN BOTH HANDS: Primary | ICD-10-CM

## 2019-08-21 PROCEDURE — 99214 OFFICE O/P EST MOD 30 MIN: CPT | Performed by: FAMILY MEDICINE

## 2019-08-21 RX ORDER — IBUPROFEN 800 MG/1
800 TABLET ORAL 2 TIMES DAILY PRN
Qty: 60 TABLET | Refills: 1 | Status: SHIPPED | OUTPATIENT
Start: 2019-08-21 | End: 2019-11-09 | Stop reason: SDUPTHER

## 2019-08-21 RX ORDER — HYDROCODONE BITARTRATE AND ACETAMINOPHEN 5; 325 MG/1; MG/1
1 TABLET ORAL EVERY 6 HOURS PRN
Qty: 28 TABLET | Refills: 0 | Status: SHIPPED | OUTPATIENT
Start: 2019-08-21 | End: 2019-08-28

## 2019-08-21 NOTE — LETTER
[facility] [address] [place] [address]  [address] [facility],[address] [facility]  Phone: [phone]  Fax: [phone]    Bairon Garcia MD        August 21, 2019     Patient: Mak Calderon   YOB: 1990   Date of Visit: 8/21/2019       To Whom It May Concern: It is my medical opinion that Mak Must be placed on light duty with no heavy lifting. This restriction will start on 8/21/2019 until 9/21/2019. If you have any questions or concerns, please don't hesitate to call.     Sincerely,        Bairon Garcia MD

## 2019-08-21 NOTE — PROGRESS NOTES
Years: 9.00     Pack years: 9.00     Types: E-Cigarettes    Smokeless tobacco: Never Used    Tobacco comment: smokes vape   Substance and Sexual Activity    Alcohol use: No    Drug use: No    Sexual activity: Yes     Partners: Female   Lifestyle    Physical activity:     Days per week: Not on file     Minutes per session: Not on file    Stress: Not on file   Relationships    Social connections:     Talks on phone: Not on file     Gets together: Not on file     Attends Roman Catholic service: Not on file     Active member of club or organization: Not on file     Attends meetings of clubs or organizations: Not on file     Relationship status: Not on file    Intimate partner violence:     Fear of current or ex partner: Not on file     Emotionally abused: Not on file     Physically abused: Not on file     Forced sexual activity: Not on file   Other Topics Concern    Not on file   Social History Narrative    Not on file       Allergies   Allergen Reactions    Tape Mac Autumn Tape] Rash     And tegaderms     Current Outpatient Medications   Medication Sig Dispense Refill    HYDROcodone-acetaminophen (NORCO) 5-325 MG per tablet Take 1 tablet by mouth every 6 hours as needed for Pain for up to 7 days. Intended supply: 7 days.  Take lowest dose possible to manage pain 28 tablet 0    ibuprofen (ADVIL;MOTRIN) 800 MG tablet Take 1 tablet by mouth 2 times daily as needed for Pain 60 tablet 1    pantoprazole (PROTONIX) 40 MG tablet Take 1 tablet by mouth daily 30 tablet 5    metoprolol succinate (TOPROL XL) 50 MG extended release tablet Take 1 tablet by mouth daily 90 tablet 3    lisinopril (PRINIVIL;ZESTRIL) 20 MG tablet Take 1 tablet by mouth daily 90 tablet 3    cloNIDine (CATAPRES) 0.1 MG tablet TAKE ONE TABLET BY MOUTH TWICE DAILY 180 tablet 3    diphenhydrAMINE (BENADRYL) 25 MG tablet Take 50 mg by mouth every 6 hours as needed for Itching (nightly, sleep)      topiramate (TOPAMAX) 25 MG tablet 2 times daily  ibuprofen (ADVIL;MOTRIN) 800 MG tablet       Multiple Vitamins-Minerals (MULTIVITAL) CHEW Take by mouth daily       No current facility-administered medications for this visit. Review of Systems   Constitutional: Negative for activity change, chills, fatigue and fever. Respiratory: Negative for cough and shortness of breath. Cardiovascular: Negative for chest pain. Musculoskeletal: Positive for arthralgias (both shoulder and hand).        Lab Results   Component Value Date    WBC 7.2 09/28/2018    HGB 15.6 09/28/2018    HCT 45.4 09/28/2018    MCV 93.4 09/28/2018     09/28/2018     Lab Results   Component Value Date     09/28/2018    K 4.6 09/28/2018     09/28/2018    CO2 22 09/28/2018    BUN 6 (L) 09/28/2018    CREATININE 0.9 09/28/2018    GLUCOSE 131 (H) 09/28/2018    CALCIUM 9.6 09/28/2018    PROT 7.9 06/07/2018    LABALBU 4.8 09/28/2018    BILITOT 0.7 06/07/2018    ALKPHOS 84 06/07/2018    AST 41 (H) 06/07/2018    ALT 61 (H) 06/07/2018    LABGLOM >60 09/28/2018    GFRAA >60 09/28/2018     Lab Results   Component Value Date    CHOL 125 06/07/2018    CHOL 135 03/22/2018    CHOL 133 04/22/2017     Lab Results   Component Value Date    TRIG 91 06/07/2018    TRIG 166 (H) 03/22/2018    TRIG 388 (H) 04/22/2017     Lab Results   Component Value Date    HDL 33 (L) 06/07/2018    HDL 33 (L) 03/22/2018    HDL 23 (L) 04/22/2017     Lab Results   Component Value Date    LDLCALC 32 04/22/2017     Lab Results   Component Value Date    LABA1C 5.1 09/28/2018     Lab Results   Component Value Date    TSHHS 2.320 06/07/2018         /84 (Site: Left Upper Arm, Position: Sitting, Cuff Size: Large Adult)   Pulse 70   Ht 6' 1\" (1.854 m)   Wt (!) 314 lb (142.4 kg)   SpO2 96%   BMI 41.43 kg/m²     BP Readings from Last 3 Encounters:   08/21/19 122/84   08/02/19 132/89   04/03/19 115/70       Wt Readings from Last 3 Encounters:   08/21/19 (!) 314 lb (142.4 kg)   08/02/19 (!) 304 lb (137.9 kg)

## 2019-08-25 ASSESSMENT — ENCOUNTER SYMPTOMS
COUGH: 0
SHORTNESS OF BREATH: 0

## 2019-09-12 ENCOUNTER — OFFICE VISIT (OUTPATIENT)
Dept: PHYSICAL MEDICINE AND REHAB | Age: 29
End: 2019-09-12
Payer: COMMERCIAL

## 2019-09-12 DIAGNOSIS — M79.602 PARESTHESIA AND PAIN OF BOTH UPPER EXTREMITIES: ICD-10-CM

## 2019-09-12 DIAGNOSIS — G56.22 ULNAR NEUROPATHY AT WRIST, LEFT: ICD-10-CM

## 2019-09-12 DIAGNOSIS — R20.2 PARESTHESIA AND PAIN OF BOTH UPPER EXTREMITIES: ICD-10-CM

## 2019-09-12 DIAGNOSIS — M79.601 PARESTHESIA AND PAIN OF BOTH UPPER EXTREMITIES: ICD-10-CM

## 2019-09-12 DIAGNOSIS — G56.03 BILATERAL CARPAL TUNNEL SYNDROME: Primary | ICD-10-CM

## 2019-09-12 PROCEDURE — 95911 NRV CNDJ TEST 9-10 STUDIES: CPT | Performed by: PHYSICAL MEDICINE & REHABILITATION

## 2019-09-12 PROCEDURE — 95886 MUSC TEST DONE W/N TEST COMP: CPT | Performed by: PHYSICAL MEDICINE & REHABILITATION

## 2019-09-24 ENCOUNTER — HOSPITAL ENCOUNTER (EMERGENCY)
Age: 29
Discharge: HOME | End: 2019-09-25
Payer: COMMERCIAL

## 2019-09-24 ENCOUNTER — APPOINTMENT (OUTPATIENT)
Dept: ULTRASOUND IMAGING | Age: 29
End: 2019-09-24
Payer: COMMERCIAL

## 2019-09-24 DIAGNOSIS — N50.811 PAIN IN RIGHT TESTICLE: Primary | ICD-10-CM

## 2019-09-24 DIAGNOSIS — N43.3 HYDROCELE, UNSPECIFIED HYDROCELE TYPE: ICD-10-CM

## 2019-09-24 DIAGNOSIS — I86.1 RIGHT VARICOCELE: ICD-10-CM

## 2019-09-24 DIAGNOSIS — N49.2 CELLULITIS OF SCROTUM: ICD-10-CM

## 2019-09-24 LAB
BACTERIA: ABNORMAL /HPF
BILIRUBIN URINE: NEGATIVE MG/DL
BLOOD, URINE: NEGATIVE
CLARITY: CLEAR
COLOR: ABNORMAL
GLUCOSE, URINE: NEGATIVE MG/DL
KETONES, URINE: NEGATIVE MG/DL
LEUKOCYTE ESTERASE, URINE: NEGATIVE
NITRITE URINE, QUANTITATIVE: NEGATIVE
PH, URINE: 6 (ref 5–8)
PROTEIN UA: NEGATIVE MG/DL
RBC URINE: ABNORMAL /HPF (ref 0–3)
SPECIFIC GRAVITY UA: 1.01 (ref 1–1.03)
SQUAMOUS EPITHELIAL: <1 /HPF
TRICHOMONAS: ABNORMAL /HPF
UROBILINOGEN, URINE: NORMAL MG/DL (ref 0.2–1)
WBC UA: <1 /HPF (ref 0–2)

## 2019-09-24 PROCEDURE — 93975 VASCULAR STUDY: CPT

## 2019-09-24 PROCEDURE — 87591 N.GONORRHOEAE DNA AMP PROB: CPT

## 2019-09-24 PROCEDURE — 99284 EMERGENCY DEPT VISIT MOD MDM: CPT

## 2019-09-24 PROCEDURE — 87086 URINE CULTURE/COLONY COUNT: CPT

## 2019-09-24 PROCEDURE — 76870 US EXAM SCROTUM: CPT

## 2019-09-24 PROCEDURE — 87491 CHLMYD TRACH DNA AMP PROBE: CPT

## 2019-09-24 PROCEDURE — 81001 URINALYSIS AUTO W/SCOPE: CPT

## 2019-09-24 PROCEDURE — 6370000000 HC RX 637 (ALT 250 FOR IP): Performed by: PHYSICIAN ASSISTANT

## 2019-09-24 RX ORDER — METOPROLOL SUCCINATE 50 MG/1
50 TABLET, EXTENDED RELEASE ORAL DAILY
Status: DISCONTINUED | OUTPATIENT
Start: 2019-09-25 | End: 2019-09-24

## 2019-09-24 RX ORDER — CLONIDINE HYDROCHLORIDE 0.1 MG/1
0.1 TABLET ORAL ONCE
Status: COMPLETED | OUTPATIENT
Start: 2019-09-24 | End: 2019-09-24

## 2019-09-24 RX ORDER — HYDROCODONE BITARTRATE AND ACETAMINOPHEN 5; 325 MG/1; MG/1
1 TABLET ORAL EVERY 6 HOURS PRN
Qty: 10 TABLET | Refills: 0 | Status: SHIPPED | OUTPATIENT
Start: 2019-09-24 | End: 2019-09-27

## 2019-09-24 RX ORDER — CEPHALEXIN 250 MG/1
500 CAPSULE ORAL ONCE
Status: COMPLETED | OUTPATIENT
Start: 2019-09-24 | End: 2019-09-25

## 2019-09-24 RX ORDER — METOPROLOL SUCCINATE 50 MG/1
50 TABLET, EXTENDED RELEASE ORAL ONCE
Status: DISCONTINUED | OUTPATIENT
Start: 2019-09-24 | End: 2019-09-24

## 2019-09-24 RX ORDER — LISINOPRIL 10 MG/1
20 TABLET ORAL ONCE
Status: COMPLETED | OUTPATIENT
Start: 2019-09-24 | End: 2019-09-24

## 2019-09-24 RX ORDER — CEPHALEXIN 500 MG/1
500 CAPSULE ORAL 3 TIMES DAILY
Qty: 21 CAPSULE | Refills: 0 | Status: SHIPPED | OUTPATIENT
Start: 2019-09-24 | End: 2019-10-01

## 2019-09-24 RX ADMIN — LISINOPRIL 20 MG: 10 TABLET ORAL at 23:02

## 2019-09-24 RX ADMIN — CLONIDINE HYDROCHLORIDE 0.1 MG: 0.1 TABLET ORAL at 23:02

## 2019-09-24 ASSESSMENT — PAIN DESCRIPTION - LOCATION: LOCATION: SCROTUM

## 2019-09-24 ASSESSMENT — PAIN DESCRIPTION - ORIENTATION: ORIENTATION: RIGHT

## 2019-09-24 ASSESSMENT — PAIN SCALES - GENERAL: PAINLEVEL_OUTOF10: 3

## 2019-09-24 ASSESSMENT — PAIN DESCRIPTION - PAIN TYPE: TYPE: ACUTE PAIN

## 2019-09-25 VITALS
BODY MASS INDEX: 38.76 KG/M2 | RESPIRATION RATE: 17 BRPM | SYSTOLIC BLOOD PRESSURE: 147 MMHG | TEMPERATURE: 98.5 F | WEIGHT: 302 LBS | HEART RATE: 79 BPM | HEIGHT: 74 IN | DIASTOLIC BLOOD PRESSURE: 84 MMHG | OXYGEN SATURATION: 98 %

## 2019-09-25 PROCEDURE — 6370000000 HC RX 637 (ALT 250 FOR IP): Performed by: PHYSICIAN ASSISTANT

## 2019-09-25 RX ADMIN — CEPHALEXIN 500 MG: 250 CAPSULE ORAL at 00:01

## 2019-09-25 NOTE — ED PROVIDER NOTES
left hydrocele. No significant left varicoceles. Epididymis:  No significant hypervascularity involving left epididymis. The left epididymis measures 8 x 8 x 8 mm. 1. Testicular blood flow is present bilaterally. 2. Mild bilateral hydroceles. 3. Small right-sided varicoceles. Labs:  Results for orders placed or performed during the hospital encounter of 09/24/19   Urine Culture   Result Value Ref Range    Specimen URINE CLEAN CATCH     Special Requests NONE     Culture NO AEROBIC GROWTH AT 24 HOURS    C.trachomatis N.gonorrhoeae DNA   Result Value Ref Range    C. Trachomatis Amplified Negative Negative    C. Trachomatis Amplified  Negative     (NOTE)  INTERPRETIVE INFORMATION: C. trachomatis by TMA  This test is intended for medical purposes only and is not valid   for the evaluation of suspected sexual abuse or for other forensic   purposes. In certain contexts, culture may be required to meet   applicable laws and regulations for diagnosis of C. trachomatis   and N. gonorrhoeae infections. Per 2014 CDC recommendations, this   test does not include confirmation of positive results by an   alternative nucleic acid target. N. Gonorrhoeae Amplified Negative Negative    N. Gonorrhoeae Amplified  Negative     (NOTE)  INTERPRETIVE INFORMATION: N. gonorrhoeae by TMA  This test is intended for medical purposes only and is not valid   for the evaluation of suspected sexual abuse or for other forensic   purposes. In certain contexts, culture may be required to meet   applicable laws and regulations for diagnosis of C. trachomatis   and N. gonorrhoeae infections. Per 2014 CDC recommendations, this   test does not include confirmation of positive results by an   alternative nucleic acid target. Performed by [facility] , [address] [address] [phone]  www. Fatmata Isaac MD, Lab.  Director     Urinalysis   Result Value Ref Range    Color, UA STRAW (A) YELLOW    Clarity, UA CLEAR any time for new or worsening symptoms. Pt verbalized understanding and agreement with the POC. Diagnosis and plan discussed in detail with patient who understands and agrees. Patient agrees to return emergency department if symptoms worsen or any new symptoms develop. Comment: Please note this report has been produced using speech recognition software and may contain errors related to that system including errors in grammar, punctuation, and spelling, as well as words and phrases that may be inappropriate. If there are any questions or concerns please feel free to contact the dictating provider for clarification.       Jose Cisneros PA-C  09/27/19 [id number]

## 2019-09-25 NOTE — ED TRIAGE NOTES
Pt presents to ED for c/o right testicle pain and swelling. Denies dysuria. Denies further complaints. A&ox4.

## 2019-09-26 LAB
CULTURE: NORMAL
Lab: NORMAL
SPECIMEN: NORMAL

## 2019-09-27 LAB
CHLAMYDIA TRACHOMATIS AMPLIFIED DET: NEGATIVE
CHLAMYDIA TRACHOMATIS AMPLIFIED DET: NORMAL
N GONORRHOEAE AMPLIFIED DET: NEGATIVE
N GONORRHOEAE AMPLIFIED DET: NORMAL

## 2019-11-09 DIAGNOSIS — M79.642 PAIN IN BOTH HANDS: ICD-10-CM

## 2019-11-09 DIAGNOSIS — M25.511 BILATERAL SHOULDER PAIN, UNSPECIFIED CHRONICITY: ICD-10-CM

## 2019-11-09 DIAGNOSIS — M79.641 PAIN IN BOTH HANDS: ICD-10-CM

## 2019-11-09 DIAGNOSIS — M25.512 BILATERAL SHOULDER PAIN, UNSPECIFIED CHRONICITY: ICD-10-CM

## 2019-11-09 RX ORDER — IBUPROFEN 800 MG/1
800 TABLET ORAL 2 TIMES DAILY PRN
Qty: 60 TABLET | Refills: 1 | Status: SHIPPED | OUTPATIENT
Start: 2019-11-09 | End: 2020-01-28 | Stop reason: SDUPTHER

## 2019-11-15 DIAGNOSIS — K21.9 GASTROESOPHAGEAL REFLUX DISEASE WITHOUT ESOPHAGITIS: ICD-10-CM

## 2019-11-18 RX ORDER — PANTOPRAZOLE SODIUM 40 MG/1
TABLET, DELAYED RELEASE ORAL
Qty: 30 TABLET | Refills: 4 | Status: SHIPPED | OUTPATIENT
Start: 2019-11-18 | End: 2020-12-28 | Stop reason: SDUPTHER

## 2020-01-28 ENCOUNTER — OFFICE VISIT (OUTPATIENT)
Dept: FAMILY MEDICINE CLINIC | Age: 30
End: 2020-01-28
Payer: COMMERCIAL

## 2020-01-28 VITALS
BODY MASS INDEX: 40.03 KG/M2 | WEIGHT: 311.8 LBS | DIASTOLIC BLOOD PRESSURE: 85 MMHG | SYSTOLIC BLOOD PRESSURE: 125 MMHG | HEART RATE: 101 BPM | OXYGEN SATURATION: 96 %

## 2020-01-28 LAB — HBA1C MFR BLD: 5.7 %

## 2020-01-28 PROCEDURE — G8484 FLU IMMUNIZE NO ADMIN: HCPCS | Performed by: FAMILY MEDICINE

## 2020-01-28 PROCEDURE — 4004F PT TOBACCO SCREEN RCVD TLK: CPT | Performed by: FAMILY MEDICINE

## 2020-01-28 PROCEDURE — G8417 CALC BMI ABV UP PARAM F/U: HCPCS | Performed by: FAMILY MEDICINE

## 2020-01-28 PROCEDURE — 99214 OFFICE O/P EST MOD 30 MIN: CPT | Performed by: FAMILY MEDICINE

## 2020-01-28 PROCEDURE — 83036 HEMOGLOBIN GLYCOSYLATED A1C: CPT | Performed by: FAMILY MEDICINE

## 2020-01-28 PROCEDURE — G8427 DOCREV CUR MEDS BY ELIG CLIN: HCPCS | Performed by: FAMILY MEDICINE

## 2020-01-28 RX ORDER — IBUPROFEN 800 MG/1
800 TABLET ORAL 2 TIMES DAILY PRN
Qty: 60 TABLET | Refills: 1 | Status: SHIPPED | OUTPATIENT
Start: 2020-01-28 | End: 2020-05-13

## 2020-01-28 RX ORDER — AZITHROMYCIN 250 MG/1
250 TABLET, FILM COATED ORAL SEE ADMIN INSTRUCTIONS
Qty: 6 TABLET | Refills: 0 | Status: SHIPPED | OUTPATIENT
Start: 2020-01-28 | End: 2020-02-02

## 2020-01-28 RX ORDER — METHYLPREDNISOLONE 4 MG/1
TABLET ORAL
Qty: 1 KIT | Refills: 0 | Status: SHIPPED | OUTPATIENT
Start: 2020-01-28 | End: 2020-07-28

## 2020-01-28 ASSESSMENT — ENCOUNTER SYMPTOMS
SHORTNESS OF BREATH: 1
SORE THROAT: 1
RHINORRHEA: 1
COUGH: 1
WHEEZING: 1

## 2020-01-28 NOTE — PROGRESS NOTES
education level: Not on file   Occupational History    Not on file   Social Needs    Financial resource strain: Not on file    Food insecurity:     Worry: Not on file     Inability: Not on file    Transportation needs:     Medical: Not on file     Non-medical: Not on file   Tobacco Use    Smoking status: Current Every Day Smoker     Packs/day: 0.25     Years: 9.00     Pack years: 2.25     Types: E-Cigarettes    Smokeless tobacco: Never Used    Tobacco comment: smokes vape   Substance and Sexual Activity    Alcohol use: Yes     Comment: socially    Drug use: No    Sexual activity: Yes     Partners: Female   Lifestyle    Physical activity:     Days per week: Not on file     Minutes per session: Not on file    Stress: Not on file   Relationships    Social connections:     Talks on phone: Not on file     Gets together: Not on file     Attends Buddhist service: Not on file     Active member of club or organization: Not on file     Attends meetings of clubs or organizations: Not on file     Relationship status: Not on file    Intimate partner violence:     Fear of current or ex partner: Not on file     Emotionally abused: Not on file     Physically abused: Not on file     Forced sexual activity: Not on file   Other Topics Concern    Not on file   Social History Narrative    Not on file       Allergies   Allergen Reactions    Tape Morna Lapine Tape] Rash     And tegaderms     Current Outpatient Medications   Medication Sig Dispense Refill    azithromycin (ZITHROMAX) 250 MG tablet Take 1 tablet by mouth See Admin Instructions for 5 days 500mg on day 1 followed by 250mg on days 2 - 5 6 tablet 0    methylPREDNISolone (MEDROL, HARJINDER,) 4 MG tablet Take as directed on the pack 1 kit 0    ibuprofen (ADVIL;MOTRIN) 800 MG tablet Take 1 tablet by mouth 2 times daily as needed for Pain 60 tablet 1    pantoprazole (PROTONIX) 40 MG tablet TAKE 1 TABLET BY MOUTH ONE TIME A DAY  30 tablet 4    HYDROcodone-acetaminophen 7.9 06/07/2018    LABALBU 4.8 09/28/2018    BILITOT 0.7 06/07/2018    ALKPHOS 84 06/07/2018    AST 41 (H) 06/07/2018    ALT 61 (H) 06/07/2018    LABGLOM >60 09/27/2019    GFRAA >60 09/27/2019     Lab Results   Component Value Date    CHOL 125 06/07/2018    CHOL 135 03/22/2018    CHOL 133 04/22/2017     Lab Results   Component Value Date    TRIG 91 06/07/2018    TRIG 166 (H) 03/22/2018    TRIG 388 (H) 04/22/2017     Lab Results   Component Value Date    HDL 33 (L) 06/07/2018    HDL 33 (L) 03/22/2018    HDL 23 (L) 04/22/2017     Lab Results   Component Value Date    LDLCALC 32 04/22/2017     Lab Results   Component Value Date    LABA1C 5.1 09/28/2018     Lab Results   Component Value Date    TSHHS 2.320 06/07/2018         /85 (Site: Left Upper Arm, Position: Sitting, Cuff Size: Large Adult)   Pulse 101   Wt (!) 311 lb 12.8 oz (141.4 kg)   SpO2 96%   BMI 40.03 kg/m²     BP Readings from Last 3 Encounters:   01/28/20 125/85   09/30/19 124/78   09/25/19 (!) 147/84       Wt Readings from Last 3 Encounters:   01/28/20 (!) 311 lb 12.8 oz (141.4 kg)   09/30/19 (!) 313 lb (142 kg)   09/24/19 (!) 302 lb (137 kg)         Physical Exam  Constitutional:       General: He is not in acute distress. Appearance: He is well-developed. He is not diaphoretic. HENT:      Head: Normocephalic and atraumatic. Nose: Nose normal.      Mouth/Throat:      Pharynx: No oropharyngeal exudate. Eyes:      General: No scleral icterus. Neck:      Musculoskeletal: Normal range of motion and neck supple. Thyroid: No thyromegaly. Cardiovascular:      Rate and Rhythm: Normal rate and regular rhythm. Heart sounds: Normal heart sounds. No murmur. Pulmonary:      Effort: Pulmonary effort is normal.      Breath sounds: Normal breath sounds. No stridor. No wheezing or rales. Musculoskeletal: Normal range of motion. Right ankle: He exhibits normal range of motion, no swelling and no deformity. No tenderness. Left ankle: He exhibits normal range of motion, no swelling and no deformity. No tenderness. Neurological:      Mental Status: He is alert and oriented to person, place, and time. Motor: No abnormal muscle tone. Psychiatric:         Mood and Affect: Mood normal.         Behavior: Behavior normal.         ASSESSMENT/ PLAN:    1. Chronic pain of both ankles  - will send him to the rhematology    2. URI, acute  - azithromycin (ZITHROMAX) 250 MG tablet; Take 1 tablet by mouth See Admin Instructions for 5 days 500mg on day 1 followed by 250mg on days 2 - 5  Dispense: 6 tablet; Refill: 0  - methylPREDNISolone (MEDROL, HARJINDER,) 4 MG tablet; Take as directed on the pack  Dispense: 1 kit; Refill: 0    3. Multiple joint pain  - Ambulatory referral to Rheumatology    4. Pain in both hands  - ibuprofen (ADVIL;MOTRIN) 800 MG tablet; Take 1 tablet by mouth 2 times daily as needed for Pain  Dispense: 60 tablet; Refill: 1    5. Bilateral shoulder pain, unspecified chronicity  - ibuprofen (ADVIL;MOTRIN) 800 MG tablet; Take 1 tablet by mouth 2 times daily as needed for Pain  Dispense: 60 tablet; Refill: 1    6. Chronic fatigue  - Ambulatory referral to Sleep Medicine    7. Hyperglycemia  - POCT glycosylated hemoglobin (Hb A1C), WNL                - Appropriateprescription are addressed. - After visit summery provided. - Questions answered and patient verbalizes understanding.  - Call for any problem, questions, or concerns. Return in about 6 months (around 7/28/2020).

## 2020-02-02 ASSESSMENT — ENCOUNTER SYMPTOMS
SINUS PRESSURE: 1
ABDOMINAL PAIN: 0
NAUSEA: 0
SINUS PAIN: 1
BACK PAIN: 1
CONSTIPATION: 0
DIARRHEA: 0

## 2020-03-19 ENCOUNTER — OFFICE VISIT (OUTPATIENT)
Dept: FAMILY MEDICINE CLINIC | Age: 30
End: 2020-03-19
Payer: COMMERCIAL

## 2020-03-19 VITALS
WEIGHT: 315 LBS | HEART RATE: 93 BPM | OXYGEN SATURATION: 97 % | BODY MASS INDEX: 41.14 KG/M2 | DIASTOLIC BLOOD PRESSURE: 100 MMHG | SYSTOLIC BLOOD PRESSURE: 125 MMHG

## 2020-03-19 PROCEDURE — G8417 CALC BMI ABV UP PARAM F/U: HCPCS | Performed by: FAMILY MEDICINE

## 2020-03-19 PROCEDURE — 99214 OFFICE O/P EST MOD 30 MIN: CPT | Performed by: FAMILY MEDICINE

## 2020-03-19 PROCEDURE — G8427 DOCREV CUR MEDS BY ELIG CLIN: HCPCS | Performed by: FAMILY MEDICINE

## 2020-03-19 PROCEDURE — 96372 THER/PROPH/DIAG INJ SC/IM: CPT | Performed by: FAMILY MEDICINE

## 2020-03-19 PROCEDURE — G8484 FLU IMMUNIZE NO ADMIN: HCPCS | Performed by: FAMILY MEDICINE

## 2020-03-19 PROCEDURE — 4004F PT TOBACCO SCREEN RCVD TLK: CPT | Performed by: FAMILY MEDICINE

## 2020-03-19 RX ORDER — TIZANIDINE 4 MG/1
4 TABLET ORAL 2 TIMES DAILY
Qty: 20 TABLET | Refills: 0 | Status: SHIPPED | OUTPATIENT
Start: 2020-03-19 | End: 2020-03-29

## 2020-03-19 RX ORDER — KETOROLAC TROMETHAMINE 30 MG/ML
60 INJECTION, SOLUTION INTRAMUSCULAR; INTRAVENOUS ONCE
Status: COMPLETED | OUTPATIENT
Start: 2020-03-19 | End: 2020-03-19

## 2020-03-19 RX ORDER — PREDNISONE 20 MG/1
20 TABLET ORAL DAILY
Qty: 10 TABLET | Refills: 0 | Status: SHIPPED | OUTPATIENT
Start: 2020-03-19 | End: 2020-03-29

## 2020-03-19 RX ADMIN — KETOROLAC TROMETHAMINE 60 MG: 30 INJECTION, SOLUTION INTRAMUSCULAR; INTRAVENOUS at 15:32

## 2020-03-19 ASSESSMENT — ENCOUNTER SYMPTOMS: BACK PAIN: 1

## 2020-03-19 NOTE — PROGRESS NOTES
Current Every Day Smoker     Packs/day: 0.25     Years: 9.00     Pack years: 2.25     Types: E-Cigarettes    Smokeless tobacco: Never Used    Tobacco comment: smokes vape   Substance and Sexual Activity    Alcohol use: Yes     Comment: socially    Drug use: No    Sexual activity: Yes     Partners: Female   Lifestyle    Physical activity     Days per week: Not on file     Minutes per session: Not on file    Stress: Not on file   Relationships    Social connections     Talks on phone: Not on file     Gets together: Not on file     Attends Sikhism service: Not on file     Active member of club or organization: Not on file     Attends meetings of clubs or organizations: Not on file     Relationship status: Not on file    Intimate partner violence     Fear of current or ex partner: Not on file     Emotionally abused: Not on file     Physically abused: Not on file     Forced sexual activity: Not on file   Other Topics Concern    Not on file   Social History Narrative    Not on file       Allergies   Allergen Reactions    Tape [place] Reddish Tape] Rash     And tegaderms     Current Outpatient Medications   Medication Sig Dispense Refill    predniSONE (DELTASONE) 20 MG tablet Take 1 tablet by mouth daily for 10 days 10 tablet 0    tiZANidine (ZANAFLEX) 4 MG tablet Take 1 tablet by mouth 2 times daily for 10 days 20 tablet 0    metoprolol succinate (TOPROL XL) 50 MG extended release tablet Take 1 tablet by mouth daily 90 tablet 3    lisinopril (PRINIVIL;ZESTRIL) 20 MG tablet Take 1 tablet by mouth daily 90 tablet 3    methylPREDNISolone (MEDROL, HARJINDER,) 4 MG tablet Take as directed on the pack 1 kit 0    ibuprofen (ADVIL;MOTRIN) 800 MG tablet Take 1 tablet by mouth 2 times daily as needed for Pain 60 tablet 1    pantoprazole (PROTONIX) 40 MG tablet TAKE 1 TABLET BY MOUTH ONE TIME A DAY  30 tablet 4    hydrochlorothiazide (HYDRODIURIL) 12.5 MG tablet Take 1 tablet by mouth every morning 90 tablet 3    diphenhydrAMINE (BENADRYL) 25 MG tablet Take 50 mg by mouth every 6 hours as needed for Itching (nightly, sleep)      topiramate (TOPAMAX) 25 MG tablet 2 times daily       cloNIDine (CATAPRES) 0.1 MG tablet TAKE ONE TABLET BY MOUTH TWICE DAILY 180 tablet 3    HYDROcodone-acetaminophen (NORCO) 5-325 MG per tablet Take 1 tablet by mouth every 6 hours as needed for Pain. No current facility-administered medications for this visit. Review of Systems   Constitutional: Positive for activity change. Negative for chills and fever. HENT: Negative for congestion. Respiratory: Negative for cough and shortness of breath. Cardiovascular: Negative for chest pain and leg swelling. Gastrointestinal: Negative for abdominal pain and bowel incontinence. Genitourinary: Negative for bladder incontinence, dysuria and flank pain. Musculoskeletal: Positive for back pain. Negative for gait problem. Neurological: Positive for tingling. Negative for weakness, numbness and headaches. Psychiatric/Behavioral: Negative for agitation. The patient is not nervous/anxious.         Lab Results   Component Value Date    WBC 7.2 09/28/2018    HGB 15.6 09/28/2018    HCT 45.4 09/28/2018    MCV 93.4 09/28/2018     09/28/2018     Lab Results   Component Value Date     09/27/2019    K 3.9 09/27/2019     09/27/2019    CO2 22 09/27/2019    BUN 8 09/27/2019    CREATININE 1.0 09/27/2019    GLUCOSE 116 (H) 09/27/2019    CALCIUM 9.3 09/27/2019    PROT 7.9 06/07/2018    LABALBU 4.8 09/28/2018    BILITOT 0.7 06/07/2018    ALKPHOS 84 06/07/2018    AST 41 (H) 06/07/2018    ALT 61 (H) 06/07/2018    LABGLOM >60 09/27/2019    GFRAA >60 09/27/2019     Lab Results   Component Value Date    CHOL 125 06/07/2018    CHOL 135 03/22/2018    CHOL 133 04/22/2017     Lab Results   Component Value Date    TRIG 91 06/07/2018    TRIG 166 (H) 03/22/2018    TRIG 388 (H) 04/22/2017     Lab Results   Component Value Date    HDL 33 (L) 06/07/2018    HDL 33 (L) 03/22/2018    HDL 23 (L) 04/22/2017     Lab Results   Component Value Date    LDLCALC 32 04/22/2017     Lab Results   Component Value Date    LABA1C 5.7 01/28/2020     Lab Results   Component Value Date    TSHHS 2.320 06/07/2018         BP (!) 125/100 (Site: Left Upper Arm, Position: Sitting, Cuff Size: Large Adult)   Pulse 93   Wt (!) 320 lb 6.4 oz (145.3 kg)   SpO2 97%   BMI 41.14 kg/m²     BP Readings from Last 3 Encounters:   03/19/20 (!) 125/100   01/28/20 125/85   09/30/19 124/78       Wt Readings from Last 3 Encounters:   03/19/20 (!) 320 lb 6.4 oz (145.3 kg)   01/28/20 (!) 311 lb 12.8 oz (141.4 kg)   09/30/19 (!) 313 lb (142 kg)         Physical Exam  Constitutional:       Appearance: Normal appearance. HENT:      Head: Normocephalic and atraumatic. Neck:      Musculoskeletal: Normal range of motion and neck supple. Cardiovascular:      Rate and Rhythm: Normal rate and regular rhythm. Heart sounds: No murmur. Pulmonary:      Effort: Pulmonary effort is normal.      Breath sounds: Normal breath sounds. No wheezing. Musculoskeletal:         General: No swelling. Lumbar back: He exhibits decreased range of motion, tenderness, pain and spasm. He exhibits no edema and no deformity. Right lower leg: No edema. Left lower leg: No edema. Neurological:      Mental Status: He is alert and oriented to person, place, and time. Psychiatric:         Mood and Affect: Mood normal.         Behavior: Behavior normal.         ASSESSMENT/ PLAN:    1. Lumbar pain  - the MRI on 7/2017 showed bulging disc.  - ketorolac (TORADOL) injection 60 mg  - MRI LUMBAR SPINE WO CONTRAST; Future  - predniSONE (DELTASONE) 20 MG tablet; Take 1 tablet by mouth daily for 10 days  Dispense: 10 tablet; Refill: 0  - tiZANidine (ZANAFLEX) 4 MG tablet; Take 1 tablet by mouth 2 times daily for 10 days  Dispense: 20 tablet; Refill: 0                - Appropriateprescription are addressed.   - After visit montrell provided. - Questions answered and patient verbalizes understanding.  - Call for any problem, questions, or concerns.  - RTC if symptoms worse. Return if symptoms worsen or fail to improve.

## 2020-03-21 ENCOUNTER — PATIENT MESSAGE (OUTPATIENT)
Dept: FAMILY MEDICINE CLINIC | Age: 30
End: 2020-03-21

## 2020-03-21 ASSESSMENT — ENCOUNTER SYMPTOMS
BOWEL INCONTINENCE: 0
COUGH: 0
SHORTNESS OF BREATH: 0
ABDOMINAL PAIN: 0

## 2020-03-27 ENCOUNTER — HOSPITAL ENCOUNTER (OUTPATIENT)
Dept: MRI IMAGING | Age: 30
Discharge: HOME | End: 2020-03-27
Payer: COMMERCIAL

## 2020-03-27 PROCEDURE — 72148 MRI LUMBAR SPINE W/O DYE: CPT

## 2020-04-29 ENCOUNTER — TELEMEDICINE (OUTPATIENT)
Dept: FAMILY MEDICINE CLINIC | Age: 30
End: 2020-04-29
Payer: COMMERCIAL

## 2020-04-29 PROCEDURE — 99213 OFFICE O/P EST LOW 20 MIN: CPT | Performed by: FAMILY MEDICINE

## 2020-04-29 PROCEDURE — G8428 CUR MEDS NOT DOCUMENT: HCPCS | Performed by: FAMILY MEDICINE

## 2020-04-29 RX ORDER — PREDNISONE 20 MG/1
20 TABLET ORAL DAILY
Qty: 10 TABLET | Refills: 0 | Status: SHIPPED | OUTPATIENT
Start: 2020-04-29 | End: 2020-06-04

## 2020-04-29 NOTE — LETTER
[address] [address] [address] [facility],[address] [place] [address]  Phone: [phone]  Fax: [phone]    Patrick Peters MD        April 29, 2020     Patient: Rita Jones   YOB: 1990   Date of Visit: 4/29/2020  By virtual visit       To Whom it May Concern:         It is my medical opinion that Rita Jones be placed on light duty for 30 days.  Starting from today 4/29/2020     If you have any questions or concerns, please don't hesitate to call.     Sincerely,  Patrick Peters MD

## 2020-05-03 PROBLEM — M51.36 BULGING LUMBAR DISC: Status: ACTIVE | Noted: 2020-05-03

## 2020-05-03 PROBLEM — M51.369 BULGING LUMBAR DISC: Status: ACTIVE | Noted: 2020-05-03

## 2020-05-03 ASSESSMENT — ENCOUNTER SYMPTOMS
BACK PAIN: 1
SHORTNESS OF BREATH: 0
BOWEL INCONTINENCE: 0
COUGH: 0
ABDOMINAL PAIN: 0

## 2020-05-13 RX ORDER — IBUPROFEN 800 MG/1
TABLET ORAL
Qty: 60 TABLET | Refills: 0 | Status: SHIPPED | OUTPATIENT
Start: 2020-05-13 | End: 2020-06-05 | Stop reason: SDUPTHER

## 2020-06-04 RX ORDER — PREDNISONE 20 MG/1
TABLET ORAL
Qty: 10 TABLET | Refills: 0 | Status: SHIPPED | OUTPATIENT
Start: 2020-06-04 | End: 2020-07-28

## 2020-06-16 ENCOUNTER — PATIENT MESSAGE (OUTPATIENT)
Dept: FAMILY MEDICINE CLINIC | Age: 30
End: 2020-06-16

## 2020-07-06 ENCOUNTER — OFFICE VISIT (OUTPATIENT)
Dept: ORTHOPEDIC SURGERY | Age: 30
End: 2020-07-06
Payer: COMMERCIAL

## 2020-07-06 VITALS
OXYGEN SATURATION: 96 % | RESPIRATION RATE: 16 BRPM | HEIGHT: 74 IN | HEART RATE: 65 BPM | WEIGHT: 315 LBS | BODY MASS INDEX: 40.43 KG/M2

## 2020-07-06 PROCEDURE — 4004F PT TOBACCO SCREEN RCVD TLK: CPT | Performed by: ORTHOPAEDIC SURGERY

## 2020-07-06 PROCEDURE — 99203 OFFICE O/P NEW LOW 30 MIN: CPT | Performed by: ORTHOPAEDIC SURGERY

## 2020-07-06 PROCEDURE — G8417 CALC BMI ABV UP PARAM F/U: HCPCS | Performed by: ORTHOPAEDIC SURGERY

## 2020-07-06 PROCEDURE — G8427 DOCREV CUR MEDS BY ELIG CLIN: HCPCS | Performed by: ORTHOPAEDIC SURGERY

## 2020-07-06 PROCEDURE — 20610 DRAIN/INJ JOINT/BURSA W/O US: CPT | Performed by: ORTHOPAEDIC SURGERY

## 2020-07-06 ASSESSMENT — ENCOUNTER SYMPTOMS
SHORTNESS OF BREATH: 0
COLOR CHANGE: 0

## 2020-07-06 NOTE — PROGRESS NOTES
Subjective:      Patient ID: Tatyana Martinez is a 31 y.o. male. Patient is here today for his first evaluation of bilateral shoulders. patient states pain has been going on for about 6 months. Patient states pain will increase when he is at work. 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 patient states pain today is a 5/10. Patient states he will take ibuprofen for the pain olvera. Patient denies any major injury or accident. Patient states that he has not hand any treatment for the bilateral shoulder. Patient states he does take prednisolone to help with his lower back pain. It does help take the edge off in the shoulders. He comes in today for his first visit with me in regards to his bilateral shoulder pain. He states that over the past 6 months he has had a progressively worsening dull, aching pain in the tops of both of his shoulders, right worse than left. He states that now due to the pain he is having weakness when trying to lift things over his head. Patient denies any new injury to the involved extremity/ joint, denies numbness or tingling in the involved extremity and denies fever or chills. He works with concrete construction      Review of Systems   Constitutional: Negative for activity change, chills and fever. Respiratory: Negative for shortness of breath. Cardiovascular: Negative for chest pain. Musculoskeletal: Positive for arthralgias and myalgias. Negative for gait problem and joint swelling. Skin: Negative for color change, pallor, rash and wound. Neurological: Negative for weakness and numbness. Past Medical History:   Diagnosis Date    Depression 5/31/2016    Essential hypertension 5/31/2016    Hypertriglyceridemia 4/25/2017       Objective:   Physical Exam  Constitutional:       Appearance: He is well-developed. HENT:      Head: Normocephalic and atraumatic.    Eyes: Pupils: Pupils are equal, round, and reactive to light. Neck:      Musculoskeletal: Normal range of motion. Pulmonary:      Effort: Pulmonary effort is normal.   Musculoskeletal: Normal range of motion. General: Tenderness present. No deformity. Right shoulder: He exhibits tenderness, crepitus and pain. He exhibits normal range of motion, no bony tenderness, no swelling, no effusion, no deformity, no laceration, no spasm, normal pulse and normal strength. Left shoulder: He exhibits tenderness, crepitus and pain. He exhibits normal range of motion, no bony tenderness, no swelling, no effusion, no deformity, no laceration, no spasm, normal pulse and normal strength. Right elbow: Normal.     Left elbow: Normal.   Skin:     General: Skin is warm and dry. Coloration: Skin is not pale. Findings: No erythema or rash. Neurological:      Mental Status: He is alert and oriented to person, place, and time. Sensory: No sensory deficit. Right shoulder-Skin intact with no erythema, ecchymosis or lacerations present. Full range of motion  Positive Gomez sign  Strength 5/5 to resisted abduction. Left shoulder-Skin intact with no erythema, ecchymosis or lacerations present. Full range of motion  Positive Gomez sign  Strength 5/5 to resisted abduction. Xr Shoulder Right (min 2 Views)    Result Date: 7/6/2020  XRAY X-ray 3 views of the right shoulder  obtained and reviewed by me today in the office demonstrates age appropriate bone density throughout with a type I acromion, mild narrowing of the acromioclavicular joint, no subluxation or dislocation noted, no acute osseous abnormalities, no bony prominences, no loose bodies. Impression: Normal right shoulder with no acute process.     Xr Shoulder Left (min 2 Views)    Result Date: 7/6/2020  XRAY X-ray 3 views of the left shoulder  obtained and reviewed by me today in the office demonstrates age appropriate bone density throughout with a type I acromion, mild narrowing of the acromioclavicular joint, no subluxation or dislocation noted, no acute osseous abnormalities, no bony prominences, no loose bodies. Impression: Normal left shoulder with no acute process. Assessment:      Right shoulder impingement  Left shoulder impingement      Plan:      I discussed with him today his x-ray findings. I explained to him that he does have bilateral shoulder impingement. At this point I recommend that we begin with conservative treatment. I discussed performing a cortisone injection with the patient today. The patient agrees and would like to proceed with the cortisone injection. I explained to them that we can repeat these injections every 3 months if needed. Continue weight-bearing as tolerated. Continue range of motion exercises as instructed. Ice and elevate as needed. Tylenol or Motrin for pain. Follow up in 6 weeks for recheck and if no better will consider MRI    Subacromial Shoulder Injection Procedure Note    Pre-operative Diagnosis: Right rotator cuff tendinitis    Post-operative Diagnosis: same    Indications: Symptomatic relief of tendinitis    Anesthesia: Lidocaine 1% and marcaine 0.5% without epinephrine without added sodium bicarbonate    Procedure Details     Verbal consent was obtained for the procedure. The right shoulder was prepped with alcohol. A 22 gauge needle was advanced into the right subacromial space through posterior approach without difficulty  The space was then injected with 1 ml 1% lidocaine and 1 ml 0.5% marcaine and 1 ml of triamcinolone (KENALOG) 40mg/ml. The injection site was cleansed with isopropyl alcohol and a dressing was applied. Complications:  None; patient tolerated the procedure well. Symptoms were improved immediately after the injection.     Subacromial Shoulder Injection Procedure Note    Pre-operative Diagnosis: left rotator cuff tendinitis    Post-operative Diagnosis: same    Indications: Symptomatic relief of tendinitis    Anesthesia: Lidocaine 1% and marcaine 0.5% without epinephrine without added sodium bicarbonate    Procedure Details     Verbal consent was obtained for the procedure. The shoulder was prepped with alcohol. A 22 gauge needle was advanced into the subacromial space through posterior approach without difficulty  The space was then injected with 1 ml 1% lidocaine and 1 ml 0.5% marcaine and 1 ml of triamcinolone (KENALOG) 40mg/ml. The injection site was cleansed with isopropyl alcohol and a dressing was applied. Complications:  None; patient tolerated the procedure well. Symptoms were improved immediately after the injection.           Rom [address], DO

## 2020-07-28 ENCOUNTER — VIRTUAL VISIT (OUTPATIENT)
Dept: FAMILY MEDICINE CLINIC | Age: 30
End: 2020-07-28
Payer: COMMERCIAL

## 2020-07-28 PROCEDURE — G8428 CUR MEDS NOT DOCUMENT: HCPCS | Performed by: FAMILY MEDICINE

## 2020-07-28 PROCEDURE — 99213 OFFICE O/P EST LOW 20 MIN: CPT | Performed by: FAMILY MEDICINE

## 2020-07-28 RX ORDER — DICYCLOMINE HYDROCHLORIDE 10 MG/1
10 CAPSULE ORAL 3 TIMES DAILY PRN
Qty: 60 CAPSULE | Refills: 0 | Status: SHIPPED | OUTPATIENT
Start: 2020-07-28 | End: 2021-01-20

## 2020-07-28 RX ORDER — GUAIFENESIN 600 MG/1
1200 TABLET, EXTENDED RELEASE ORAL 2 TIMES DAILY
Qty: 40 TABLET | Refills: 0 | Status: SHIPPED | OUTPATIENT
Start: 2020-07-28 | End: 2020-08-07

## 2020-07-28 RX ORDER — TOPIRAMATE 50 MG/1
50 TABLET, FILM COATED ORAL 2 TIMES DAILY
Qty: 60 TABLET | Refills: 3 | Status: SHIPPED | OUTPATIENT
Start: 2020-07-28 | End: 2021-09-01 | Stop reason: SDUPTHER

## 2020-07-28 ASSESSMENT — ENCOUNTER SYMPTOMS
SINUS PAIN: 0
SHORTNESS OF BREATH: 0
SINUS PRESSURE: 0
BACK PAIN: 1
COUGH: 1
SORE THROAT: 0

## 2020-07-28 NOTE — PROGRESS NOTES
2020    TELEHEALTH EVALUATION -- Audio/Visual (During QZBVA-19 public health emergency)    Chief Complaint   Patient presents with    6 Month Follow-Up    Hypertension    Other     regardinghis back pain, shoulder pain, hands pain, and also now left knee pain         HPI:    Tatyana Paytons (:  1990) has requested an audio/video evaluation for the following concern(s):    Patient for 6 months f/u regarding his HTN, The patient is taking hypertensive medications compliantly without side effects. Denies chest pain, dyspnea, edema, or TIA's. Patient as usual c/o multiple joints pain, has heavy duty job, doing lot of physical activities, patient already saw HEBERT Chavez and has injection in both shoulder stats didn't help him, we send him to pain D but didn't go, he c/o hands pain, and also he injured his left knee, and has pain x 2 wks, able to put wt on it, no swelling, but stats pop from time to time, he is going to F/U with Dr Duane Chavez. Patient also c/o cough and lots of sputum, denies fever or SOB. Review of Systems   Constitutional: Positive for activity change. Negative for chills and fever. HENT: Negative for congestion, sinus pressure, sinus pain and sore throat. Respiratory: Positive for cough. Negative for shortness of breath. Cardiovascular: Negative for chest pain and leg swelling. Genitourinary: Negative for dysuria and flank pain. Musculoskeletal: Positive for arthralgias (multiple joints pain) and back pain. Negative for gait problem. Neurological: Negative for numbness and headaches. Psychiatric/Behavioral: Negative for agitation. The patient is not nervous/anxious. Prior to Visit Medications    Medication Sig Taking?  Authorizing Provider   dicyclomine (BENTYL) 10 MG capsule Take 1 capsule by mouth 3 times daily as needed (abdominal pain) Yes Kacie Alvarez MD   topiramate (TOPAMAX) 50 MG tablet Take 1 tablet by mouth 2 times daily Yes Lindy RAMIREZ Christa Mo MD   guaiFENesin (MUCINEX) 600 MG extended release tablet Take 2 tablets by mouth 2 times daily for 10 days Yes Florence Hartmann MD   ibuprofen (ADVIL;MOTRIN) 800 MG tablet Take 1 tablet by mouth 2 times daily as needed for Pain  Florence Hartmann MD   metoprolol succinate (TOPROL XL) 50 MG extended release tablet Take 1 tablet by mouth daily  Norma Beach MD   lisinopril (PRINIVIL;ZESTRIL) 20 MG tablet Take 1 tablet by mouth daily  Jh Angeles MD   cloNIDine (CATAPRES) 0.1 MG tablet TAKE ONE TABLET BY MOUTH TWICE DAILY  Jh Angeles MD   pantoprazole (PROTONIX) 40 MG tablet TAKE 1 TABLET BY MOUTH ONE TIME A DAY   Florence Hartmann MD   hydrochlorothiazide (HYDRODIURIL) 12.5 MG tablet Take 1 tablet by mouth every morning  Jh Angeles MD   diphenhydrAMINE (BENADRYL) 25 MG tablet Take 50 mg by mouth every 6 hours as needed for Itching (nightly, sleep)  Historical Provider, MD       Social History     Tobacco Use    Smoking status: Current Every Day Smoker     Packs/day: 0.25     Years: 9.00     Pack years: 2.25     Types: E-Cigarettes    Smokeless tobacco: Never Used    Tobacco comment: smokes vape   Substance Use Topics    Alcohol use: Yes     Comment: socially    Drug use: No        Allergies   Allergen Reactions    Tape [Adhesive Tape] Rash     And tegaderms   ,   Past Medical History:   Diagnosis Date    Depression 5/31/2016    Essential hypertension 5/31/2016    Hypertriglyceridemia 4/25/2017   ,   Past Surgical History:   Procedure Laterality Date    CHOLECYSTECTOMY, LAPAROSCOPIC  06/12/2018   ,   Social History     Tobacco Use    Smoking status: Current Every Day Smoker     Packs/day: 0.25     Years: 9.00     Pack years: 2.25     Types: E-Cigarettes    Smokeless tobacco: Never Used    Tobacco comment: smokes vape   Substance Use Topics    Alcohol use: Yes     Comment: socially    Drug use: No   ,   Family History   Problem Relation Age of Onset    Diabetes Maternal Uncle  Stroke Maternal Uncle     Cancer Maternal Grandmother     Heart Disease Maternal Grandmother     Stroke Maternal Grandmother     Stroke Maternal Grandfather        PHYSICAL EXAMINATION:  [ INSTRUCTIONS:  \"[x]\" Indicates a positive item  \"[]\" Indicates a negative item  -- DELETE ALL ITEMS NOT EXAMINED]  Vital Signs: (As obtained by patient/caregiver or practitioner observation)    Blood pressure-  Heart rate-    Respiratory rate-    Temperature-  Pulse oximetry-     Constitutional: [x] Appears well-developed and well-nourished [x] No apparent distress      [] Abnormal-   Mental status  [x] Alert and awake  [x] Oriented to person/place/time [x]Able to follow commands      Eyes:  EOM    [x]  Normal  [] Abnormal-  Sclera  []  Normal  [] Abnormal -         Discharge []  None visible  [] Abnormal -    HENT:   [x] Normocephalic, atraumatic. [] Abnormal   [] Mouth/Throat: Mucous membranes are moist.     External Ears [] Normal  [] Abnormal-     Neck: [] No visualized mass     Pulmonary/Chest: [x] Respiratory effort normal.  [x] No visualized signs of difficulty breathing or respiratory distress        [] Abnormal-      Musculoskeletal:   [] Normal gait with no signs of ataxia         [] Normal range of motion of neck        [] Abnormal-       Neurological:        [x] No Facial Asymmetry (Cranial nerve 7 motor function) (limited exam to video visit)          [] No gaze palsy        [] Abnormal-         Skin:        [] No significant exanthematous lesions or discoloration noted on facial skin         [] Abnormal-            Psychiatric:       [x] Normal Affect [] No Hallucinations        [] Abnormal-     Other pertinent observable physical exam findings-     ASSESSMENT/PLAN:  1. Essential hypertension  - stable, per patient doing fine  BP Readings from Last 3 Encounters:   04/06/20 120/70   03/19/20 (!) 125/100   01/28/20 125/85       2. Gastroesophageal reflux disease without esophagitis  - stable    3.  Chronic midline low back pain with sciatica, sciatica laterality unspecified  - [facility] - Susan Matias MD, Pain Management, [place]    4. Chronic pain of multiple joints  - patient quitting his job, next Friday, could be job related pain    5. Obesity, morbid, BMI 40.0-49.9 ([address]  - encourage wt loss    6. Cough, mucinx Rx was given    Return in about 6 months (around 1/28/2021). Sabra Choudhury is a 31 y.o. male being evaluated by a Virtual Visit (video visit) encounter to address concerns as mentioned above. A caregiver was present when appropriate. Due to this being a TeleHealth encounter ([facility]), evaluation of the following organ systems was limited: Vitals/Constitutional/EENT/Resp/CV/GI//MS/Neuro/Skin/Heme-Lymph-Imm. Pursuant to the emergency declaration under the [address] [place] [address] authority and the [employer] and [employer] Act, this Virtual Visit was conducted with patient's (and/or legal guardian's) consent, to reduce the patient's risk of exposure to COVID-19 and provide necessary medical care. The patient (and/or legal guardian) has also been advised to contact this office for worsening conditions or problems, and seek emergency medical treatment and/or call 911 if deemed necessary. Patient identification was verified at the start of the visit: Yes    Total time spent on this encounter: 15 minutes    Services were provided through a video synchronous discussion virtually to substitute for in-person clinic visit. Patient and provider were located at their individual homes. --Stella Birch MD on 7/28/2020 at 4:20 PM    An electronic signature was used to authenticate this note.

## 2020-08-17 ENCOUNTER — OFFICE VISIT (OUTPATIENT)
Dept: ORTHOPEDIC SURGERY | Age: 30
End: 2020-08-17
Payer: COMMERCIAL

## 2020-08-17 VITALS — BODY MASS INDEX: 40.43 KG/M2 | HEIGHT: 74 IN | WEIGHT: 315 LBS | RESPIRATION RATE: 18 BRPM

## 2020-08-17 PROCEDURE — 99212 OFFICE O/P EST SF 10 MIN: CPT | Performed by: ORTHOPAEDIC SURGERY

## 2020-08-17 PROCEDURE — G8417 CALC BMI ABV UP PARAM F/U: HCPCS | Performed by: ORTHOPAEDIC SURGERY

## 2020-08-17 PROCEDURE — 4004F PT TOBACCO SCREEN RCVD TLK: CPT | Performed by: ORTHOPAEDIC SURGERY

## 2020-08-17 PROCEDURE — G8427 DOCREV CUR MEDS BY ELIG CLIN: HCPCS | Performed by: ORTHOPAEDIC SURGERY

## 2020-08-17 ASSESSMENT — ENCOUNTER SYMPTOMS
COLOR CHANGE: 0
SHORTNESS OF BREATH: 0

## 2020-08-17 NOTE — PATIENT INSTRUCTIONS
MRI of both shoulders   Activities as tolerated   Work on ROM of the shoulders   Follow up once MRI's are complete to discuss results, if negative will likely refer out to neck/spine    Central scheduling [phone]

## 2020-08-27 ENCOUNTER — HOSPITAL ENCOUNTER (OUTPATIENT)
Dept: MRI IMAGING | Age: 30
Discharge: HOME | End: 2020-08-27
Payer: COMMERCIAL

## 2020-08-27 PROCEDURE — 73221 MRI JOINT UPR EXTREM W/O DYE: CPT

## 2020-09-02 ENCOUNTER — VIRTUAL VISIT (OUTPATIENT)
Dept: ORTHOPEDIC SURGERY | Age: 30
End: 2020-09-02
Payer: COMMERCIAL

## 2020-09-02 PROCEDURE — 99421 OL DIG E/M SVC 5-10 MIN: CPT | Performed by: ORTHOPAEDIC SURGERY

## 2020-09-02 RX ORDER — CLONIDINE 0.1 MG/24H
1 PATCH, EXTENDED RELEASE TRANSDERMAL
COMMUNITY
End: 2021-09-07

## 2020-09-02 RX ORDER — HYDROCODONE BITARTRATE AND ACETAMINOPHEN 5; 325 MG/1; MG/1
1 TABLET ORAL
COMMUNITY
End: 2021-01-20 | Stop reason: SDUPTHER

## 2020-09-02 NOTE — PROGRESS NOTES
Pt is on the phone today to review MRI results of the bilateral shoulders. PT denies any changes in his Hx since he was in the office. PT states pain today is a 4/10. Increased pain with movement. MRI of the right shoulder completed on 8/27/20     Impression    1. No rotator cuff or biceps tear. 2. No labral tear identified. 3. Mild edema and fatty atrophy of the teres minor muscle, less pronounced    than the left shoulder.  No mass in the quadrilateral space. 4. Mild acromioclavicular degenerative change.         RECOMMENDATIONS:    . MRI of the left shoulder completed on 8/27/20     Impression    1. No rotator cuff or biceps tear. 2. No labral tear identified. 3. Teres minor muscle atrophy.  No mass in the quadrilateral space.     4. Mild acromioclavicular degenerative changes.

## 2020-09-02 NOTE — PATIENT INSTRUCTIONS
Continue weight-bearing as tolerated. Continue range of motion exercises as instructed. Ice and elevate as needed. Tylenol or Motrin for pain.   Follow up as needed  Referral to spinal specialist

## 2020-09-04 NOTE — PROGRESS NOTES
Kingston Pantoja is a 31 y.o. male evaluated via telephone on 9/2/2020. Pt is on the phone today to review MRI results of the bilateral shoulders. PT denies any changes in his Hx since he was in the office. PT states pain today is a 4/10. Increased pain with movement. MRI of the right shoulder completed on 8/27/20     Impression    1. No rotator cuff or biceps tear. 2. No labral tear identified. 3. Mild edema and fatty atrophy of the teres minor muscle, less pronounced    than the left shoulder.  No mass in the quadrilateral space. 4. Mild acromioclavicular degenerative change.         RECOMMENDATIONS:    . MRI of the left shoulder completed on 8/27/20     Impression    1. No rotator cuff or biceps tear. 2. No labral tear identified. 3. Teres minor muscle atrophy.  No mass in the quadrilateral space. 4. Mild acromioclavicular degenerative changes.           Consent:  He and/or health care decision maker is aware that that he may receive a bill for this telephone service, depending on his insurance coverage, and has provided verbal consent to proceed: Yes      Documentation:  I communicated with the patient and/or health care decision maker about his bilateral shoulder MRI results from August 27, 2020. Details of this discussion including any medical advice provided: I discussed with him today that there was no tears and only minimal inflammation in both of his rotator cuffs. I explained to him that at this point most of his pain does not appear to be coming from his shoulders but it sounds like it is most likely coming from his neck. After discussing this with him he commented that yes I have been having a lot of pain in my neck and it feels like it is radiating down from my neck into my fingers. I explained to him that the next course will be to get him set up for Dr. Mirna Anderson for evaluation of possible cervical radiculopathy and treatment for his neck.   Otherwise the next step would be to get him set up for a spine surgeon for further evaluation. Continue weight-bearing as tolerated. Continue range of motion exercises as instructed. Ice and elevate as needed. Tylenol or Motrin for pain. Follow up as needed. I affirm this is a Patient Initiated Episode with a Patient who has not had a related appointment within my department in the past 7 days or scheduled within the next 24 hours.     Patient identification was verified at the start of the visit: Yes    Total Time: minutes: 5-10 minutes    Note: not billable if this call serves to triage the patient into an appointment for the relevant concern      Rom Rodríguez

## 2020-12-01 ENCOUNTER — TELEPHONE (OUTPATIENT)
Dept: FAMILY MEDICINE CLINIC | Age: 30
End: 2020-12-01

## 2020-12-01 NOTE — TELEPHONE ENCOUNTER
Referral ordered. Accessed patient chart to print all necessary info for referral for faxing it to referred provider.

## 2020-12-10 ENCOUNTER — TELEPHONE (OUTPATIENT)
Dept: FAMILY MEDICINE CLINIC | Age: 30
End: 2020-12-10

## 2021-01-20 ENCOUNTER — OFFICE VISIT (OUTPATIENT)
Dept: FAMILY MEDICINE CLINIC | Age: 31
End: 2021-01-20
Payer: COMMERCIAL

## 2021-01-20 VITALS
WEIGHT: 315 LBS | OXYGEN SATURATION: 98 % | SYSTOLIC BLOOD PRESSURE: 125 MMHG | DIASTOLIC BLOOD PRESSURE: 80 MMHG | HEART RATE: 92 BPM | BODY MASS INDEX: 40.49 KG/M2

## 2021-01-20 DIAGNOSIS — G89.29 CHRONIC MIDLINE LOW BACK PAIN WITH SCIATICA, SCIATICA LATERALITY UNSPECIFIED: Primary | ICD-10-CM

## 2021-01-20 DIAGNOSIS — M54.40 CHRONIC MIDLINE LOW BACK PAIN WITH SCIATICA, SCIATICA LATERALITY UNSPECIFIED: Primary | ICD-10-CM

## 2021-01-20 PROCEDURE — G8427 DOCREV CUR MEDS BY ELIG CLIN: HCPCS | Performed by: FAMILY MEDICINE

## 2021-01-20 PROCEDURE — G8484 FLU IMMUNIZE NO ADMIN: HCPCS | Performed by: FAMILY MEDICINE

## 2021-01-20 PROCEDURE — G8417 CALC BMI ABV UP PARAM F/U: HCPCS | Performed by: FAMILY MEDICINE

## 2021-01-20 PROCEDURE — 4004F PT TOBACCO SCREEN RCVD TLK: CPT | Performed by: FAMILY MEDICINE

## 2021-01-20 PROCEDURE — 99214 OFFICE O/P EST MOD 30 MIN: CPT | Performed by: FAMILY MEDICINE

## 2021-01-20 RX ORDER — KETOROLAC TROMETHAMINE 30 MG/ML
60 INJECTION, SOLUTION INTRAMUSCULAR; INTRAVENOUS ONCE
Status: COMPLETED | OUTPATIENT
Start: 2021-01-20 | End: 2021-01-20

## 2021-01-20 RX ORDER — HYDROCODONE BITARTRATE AND ACETAMINOPHEN 5; 325 MG/1; MG/1
1 TABLET ORAL EVERY 8 HOURS PRN
Qty: 28 TABLET | Refills: 0 | Status: SHIPPED | OUTPATIENT
Start: 2021-01-20 | End: 2021-02-03

## 2021-01-20 RX ADMIN — KETOROLAC TROMETHAMINE 60 MG: 30 INJECTION, SOLUTION INTRAMUSCULAR; INTRAVENOUS at 10:21

## 2021-01-20 NOTE — PROGRESS NOTES
Briana Perez  1990 01/21/21    Chief Complaint   Patient presents with    Back Pain     Patient states still having back pain           Patient here c/o flare up of the back pain going on for few days, R lower, 8/10 in severity, stabbing in nature, worse by movement, patient was referred to pain clinic stats could not find the clinic.       Past Medical History:   Diagnosis Date    Depression 5/31/2016    Essential hypertension 5/31/2016    Hypertriglyceridemia 4/25/2017     Past Surgical History:   Procedure Laterality Date    CHOLECYSTECTOMY, LAPAROSCOPIC  06/12/2018     Family History   Problem Relation Age of Onset    Diabetes Maternal Uncle     Stroke Maternal Uncle     Cancer Maternal Grandmother     Heart Disease Maternal Grandmother     Stroke Maternal Grandmother     Stroke Maternal Grandfather      Social History     Socioeconomic History    Marital status:      Spouse name: Not on file    Number of children: Not on file    Years of education: Not on file    Highest education level: Not on file   Occupational History    Not on file   Social Needs    Financial resource strain: Not on file    Food insecurity     Worry: Not on file     Inability: Not on file    Transportation needs     Medical: Not on file     Non-medical: Not on file   Tobacco Use    Smoking status: Current Every Day Smoker     Packs/day: 0.25     Years: 9.00     Pack years: 2.25     Types: E-Cigarettes    Smokeless tobacco: Never Used    Tobacco comment: smokes vape   Substance and Sexual Activity    Alcohol use: Yes     Comment: socially    Drug use: No    Sexual activity: Yes     Partners: Female   Lifestyle    Physical activity     Days per week: Not on file     Minutes per session: Not on file    Stress: Not on file   Relationships    Social connections     Talks on phone: Not on file     Gets together: Not on file     Attends Scientologist service: Not on file     Active member of club or organization: Not on file     Attends meetings of clubs or organizations: Not on file     Relationship status: Not on file    Intimate partner violence     Fear of current or ex partner: Not on file     Emotionally abused: Not on file     Physically abused: Not on file     Forced sexual activity: Not on file   Other Topics Concern    Not on file   Social History Narrative    Not on file       Allergies   Allergen Reactions    Tape [employer] Tape] Rash     And tegaderms     Current Outpatient Medications   Medication Sig Dispense Refill    HYDROcodone-acetaminophen (NORCO) 5-325 MG per tablet Take 1 tablet by mouth every 8 hours as needed for Pain for up to 14 days. 28 tablet 0    pantoprazole (PROTONIX) 40 MG tablet TAKE 1 TABLET BY MOUTH ONE TIME A DAY 30 tablet 5    cloNIDine (CATAPRES) 0.1 MG/24HR PTWK Place 1 patch onto the skin      topiramate (TOPAMAX) 50 MG tablet Take 1 tablet by mouth 2 times daily 60 tablet 3    ibuprofen (ADVIL;MOTRIN) 800 MG tablet Take 1 tablet by mouth 2 times daily as needed for Pain 60 tablet 3    metoprolol succinate (TOPROL XL) 50 MG extended release tablet Take 1 tablet by mouth daily 90 tablet 3    lisinopril (PRINIVIL;ZESTRIL) 20 MG tablet Take 1 tablet by mouth daily 90 tablet 3    hydrochlorothiazide (HYDRODIURIL) 12.5 MG tablet Take 1 tablet by mouth every morning 90 tablet 3    diphenhydrAMINE (BENADRYL) 25 MG tablet Take 50 mg by mouth every 6 hours as needed for Itching (nightly, sleep)       No current facility-administered medications for this visit. Review of Systems   Constitutional: Positive for activity change. Negative for chills and fever. HENT: Negative for congestion. Respiratory: Negative for cough and shortness of breath. Cardiovascular: Negative for chest pain and leg swelling. Gastrointestinal: Negative for abdominal pain. Genitourinary: Negative for dysuria and flank pain. Musculoskeletal: Positive for back pain.  Negative for gait problem. Neurological: Negative for numbness and headaches. Psychiatric/Behavioral: Negative for agitation. The patient is not nervous/anxious. Lab Results   Component Value Date    WBC 7.2 09/28/2018    HGB 15.6 09/28/2018    HCT 45.4 09/28/2018    MCV 93.4 09/28/2018     09/28/2018     Lab Results   Component Value Date     09/27/2019    K 3.9 09/27/2019     09/27/2019    CO2 22 09/27/2019    BUN 8 09/27/2019    CREATININE 1.0 09/27/2019    GLUCOSE 116 (H) 09/27/2019    CALCIUM 9.3 09/27/2019    PROT 7.9 06/07/2018    LABALBU 4.8 09/28/2018    BILITOT 0.7 06/07/2018    ALKPHOS 84 06/07/2018    AST 41 (H) 06/07/2018    ALT 61 (H) 06/07/2018    LABGLOM >60 09/27/2019    GFRAA >60 09/27/2019     Lab Results   Component Value Date    CHOL 125 06/07/2018    CHOL 135 03/22/2018    CHOL 133 04/22/2017     Lab Results   Component Value Date    TRIG 91 06/07/2018    TRIG 166 (H) 03/22/2018    TRIG 388 (H) 04/22/2017     Lab Results   Component Value Date    HDL 33 (L) 06/07/2018    HDL 33 (L) 03/22/2018    HDL 23 (L) 04/22/2017     Lab Results   Component Value Date    LDLCALC 32 04/22/2017     Lab Results   Component Value Date    LABA1C 5.7 01/28/2020     Lab Results   Component Value Date    TSHHS 2.320 06/07/2018         /80 (Site: Left Upper Arm, Position: Sitting, Cuff Size: Large Adult)   Pulse 92   Wt (!) 315 lb 6.4 oz (143.1 kg)   SpO2 98%   BMI 40.49 kg/m²     BP Readings from Last 3 Encounters:   01/20/21 125/80   04/06/20 120/70   03/19/20 (!) 125/100       Wt Readings from Last 3 Encounters:   01/20/21 (!) 315 lb 6.4 oz (143.1 kg)   08/17/20 (!) 316 lb (143.3 kg)   07/06/20 (!) 320 lb (145.2 kg)         Physical Exam  Constitutional:       Appearance: Normal appearance. He is not ill-appearing. Neck:      Musculoskeletal: Normal range of motion. Cardiovascular:      Rate and Rhythm: Normal rate and regular rhythm. Heart sounds: Normal heart sounds. Pulmonary:      Effort: Pulmonary effort is normal.      Breath sounds: Normal breath sounds. Musculoskeletal:      Lumbar back: He exhibits decreased range of motion, tenderness and pain. He exhibits no swelling, no edema and no deformity. Neurological:      Mental Status: He is alert. ASSESSMENT/ PLAN:    1. Chronic midline low back pain with sciatica, sciatica laterality unspecified  - will give him just few pain medication, and will refer him to the spine specialist  - HYDROcodone-acetaminophen (Rosalia Rise) 5-325 MG per tablet; Take 1 tablet by mouth every 8 hours as needed for Pain for up to 14 days. Dispense: 28 tablet; Refill: 0  - ketorolac (TORADOL) injection 60 mg  - Amb External Referral To Spine Surgery            - Appropriate prescription are addressed. - After visit summery provided. - Questions answered and patient verbalizes understanding.  - Call for any problem, questions, or concerns. Return if symptoms worsen or fail to improve.

## 2021-01-21 ENCOUNTER — TELEPHONE (OUTPATIENT)
Dept: FAMILY MEDICINE CLINIC | Age: 31
End: 2021-01-21

## 2021-01-21 ASSESSMENT — ENCOUNTER SYMPTOMS
BACK PAIN: 1
ABDOMINAL PAIN: 0
SHORTNESS OF BREATH: 0
COUGH: 0

## 2021-01-25 ENCOUNTER — TELEPHONE (OUTPATIENT)
Dept: FAMILY MEDICINE CLINIC | Age: 31
End: 2021-01-25

## 2021-01-25 NOTE — TELEPHONE ENCOUNTER
Referral to Dr Lolis Lombardi returned they do not accept insurance. Patient will need to contact insurance and see who is in network.

## 2021-02-02 ENCOUNTER — OFFICE VISIT (OUTPATIENT)
Dept: FAMILY MEDICINE CLINIC | Age: 31
End: 2021-02-02
Payer: COMMERCIAL

## 2021-02-02 VITALS
SYSTOLIC BLOOD PRESSURE: 122 MMHG | OXYGEN SATURATION: 96 % | HEART RATE: 109 BPM | WEIGHT: 302.6 LBS | BODY MASS INDEX: 38.85 KG/M2 | DIASTOLIC BLOOD PRESSURE: 86 MMHG

## 2021-02-02 DIAGNOSIS — H92.01 REFERRED OTALGIA OF RIGHT EAR: ICD-10-CM

## 2021-02-02 DIAGNOSIS — E66.9 OBESITY (BMI 35.0-39.9 WITHOUT COMORBIDITY): ICD-10-CM

## 2021-02-02 DIAGNOSIS — Z13.29 THYROID DISORDER SCREEN: ICD-10-CM

## 2021-02-02 DIAGNOSIS — E11.9 NEW ONSET TYPE 2 DIABETES MELLITUS (HCC): Primary | ICD-10-CM

## 2021-02-02 LAB — HBA1C MFR BLD: 8.7 %

## 2021-02-02 PROCEDURE — 2022F DILAT RTA XM EVC RTNOPTHY: CPT | Performed by: FAMILY MEDICINE

## 2021-02-02 PROCEDURE — G8484 FLU IMMUNIZE NO ADMIN: HCPCS | Performed by: FAMILY MEDICINE

## 2021-02-02 PROCEDURE — 83036 HEMOGLOBIN GLYCOSYLATED A1C: CPT | Performed by: FAMILY MEDICINE

## 2021-02-02 PROCEDURE — 99214 OFFICE O/P EST MOD 30 MIN: CPT | Performed by: FAMILY MEDICINE

## 2021-02-02 PROCEDURE — 3052F HG A1C>EQUAL 8.0%<EQUAL 9.0%: CPT | Performed by: FAMILY MEDICINE

## 2021-02-02 PROCEDURE — G8427 DOCREV CUR MEDS BY ELIG CLIN: HCPCS | Performed by: FAMILY MEDICINE

## 2021-02-02 PROCEDURE — 4004F PT TOBACCO SCREEN RCVD TLK: CPT | Performed by: FAMILY MEDICINE

## 2021-02-02 PROCEDURE — G8417 CALC BMI ABV UP PARAM F/U: HCPCS | Performed by: FAMILY MEDICINE

## 2021-02-02 RX ORDER — GLUCOSAMINE HCL/CHONDROITIN SU 500-400 MG
CAPSULE ORAL
Qty: 100 STRIP | Refills: 5 | Status: SHIPPED | OUTPATIENT
Start: 2021-02-02

## 2021-02-02 RX ORDER — METFORMIN HYDROCHLORIDE 500 MG/1
500 TABLET, EXTENDED RELEASE ORAL 2 TIMES DAILY
Qty: 60 TABLET | Refills: 5 | Status: SHIPPED | OUTPATIENT
Start: 2021-02-02 | End: 2021-05-27

## 2021-02-02 NOTE — PROGRESS NOTES
Dahlia Schneider  1990 02/06/21    Chief Complaint   Patient presents with    Other     Patient states he went to urgent care and was told he is a diabetic           Patient here because was told by the urgent care he is diabetic, patient started checking his BS at and was o=more than 300, all the time, his A1C was checked 1/2020 was normal was 5. 7.patient stats dose drink lots of water, and dose urinate a lot too. Patient went to the  because of the tooth ache, and was given Augmentin, still on it, patient also c/o r ear pain X few days, no discharge, no headache and no dizziness, and no fever.       Past Medical History:   Diagnosis Date    Depression 5/31/2016    Essential hypertension 5/31/2016    Hypertriglyceridemia 4/25/2017    [facility]     Past Surgical History:   Procedure Laterality Date    CHOLECYSTECTOMY, LAPAROSCOPIC  06/12/2018     Family History   Problem Relation Age of Onset    Diabetes Maternal Uncle     Stroke Maternal Uncle     Cancer Maternal Grandmother     Heart Disease Maternal Grandmother     Stroke Maternal Grandmother     Stroke Maternal Grandfather      Social History     Socioeconomic History    Marital status:      Spouse name: Not on file    Number of children: Not on file    Years of education: Not on file    Highest education level: Not on file   Occupational History    Not on file   Social Needs    Financial resource strain: Not on file    Food insecurity     Worry: Not on file     Inability: Not on file    Transportation needs     Medical: Not on file     Non-medical: Not on file   Tobacco Use    Smoking status: Current Every Day Smoker     Packs/day: 0.25     Years: 9.00     Pack years: 2.25     Types: E-Cigarettes    Smokeless tobacco: Never Used    Tobacco comment: smokes vape   Substance and Sexual Activity    Alcohol use: Yes     Comment: socially    Drug use: No    Sexual activity: Yes hydrochlorothiazide (HYDRODIURIL) 12.5 MG tablet Take 1 tablet by mouth every morning 90 tablet 3     No current facility-administered medications for this visit. Review of Systems   Constitutional: Positive for activity change. Negative for chills and fever. HENT: Positive for dental problem and ear pain (R one). Negative for congestion, rhinorrhea, sinus pressure and sinus pain. Respiratory: Negative for cough and shortness of breath. Cardiovascular: Negative for chest pain and leg swelling. Gastrointestinal: Negative for abdominal pain. Endocrine: Positive for polydipsia and polyuria. Genitourinary: Negative for dysuria and flank pain. Musculoskeletal: Positive for back pain. Negative for gait problem. Neurological: Negative for numbness and headaches. Psychiatric/Behavioral: Negative for agitation. The patient is not nervous/anxious.         Lab Results   Component Value Date    WBC 7.2 09/28/2018    HGB 15.6 09/28/2018    HCT 45.4 09/28/2018    MCV 93.4 09/28/2018     09/28/2018     Lab Results   Component Value Date     09/27/2019    K 3.9 09/27/2019     09/27/2019    CO2 22 09/27/2019    BUN 8 09/27/2019    CREATININE 1.0 09/27/2019    GLUCOSE 116 (H) 09/27/2019    CALCIUM 9.3 09/27/2019    PROT 7.9 06/07/2018    LABALBU 4.8 09/28/2018    BILITOT 0.7 06/07/2018    ALKPHOS 84 06/07/2018    AST 41 (H) 06/07/2018    ALT 61 (H) 06/07/2018    LABGLOM >60 09/27/2019    GFRAA >60 09/27/2019     Lab Results   Component Value Date    CHOL 125 06/07/2018    CHOL 135 03/22/2018    CHOL 133 04/22/2017     Lab Results   Component Value Date    TRIG 91 06/07/2018    TRIG 166 (H) 03/22/2018    TRIG 388 (H) 04/22/2017     Lab Results   Component Value Date    HDL 33 (L) 06/07/2018    HDL 33 (L) 03/22/2018    HDL 23 (L) 04/22/2017     Lab Results   Component Value Date    LDLCALC 32 04/22/2017     Lab Results   Component Value Date    LABA1C 8.7 02/02/2021     Lab Results Component Value Date    [facility] 2.320 06/07/2018         /86 (Site: Left Upper Arm, Position: Sitting, Cuff Size: Large Adult)   Pulse 109   Wt (!) 302 lb 9.6 oz (137.3 kg)   SpO2 96%   BMI 38.85 kg/m²     BP Readings from Last 3 Encounters:   02/02/21 122/86   01/20/21 125/80   04/06/20 120/70       Wt Readings from Last 3 Encounters:   02/02/21 (!) 302 lb 9.6 oz (137.3 kg)   01/20/21 (!) 315 lb 6.4 oz (143.1 kg)   08/17/20 (!) 316 lb (143.3 kg)         Physical Exam  Constitutional:       General: He is not in acute distress. Appearance: Normal appearance. He is obese. He is not ill-appearing. HENT:      Head: Normocephalic and atraumatic. Right Ear: Tympanic membrane normal.      Left Ear: Tympanic membrane and ear canal normal. There is no impacted cerumen. Ears:      Comments: Red external canal R ear  Eyes:      General: No scleral icterus. Neck:      Musculoskeletal: Normal range of motion. Cardiovascular:      Rate and Rhythm: Normal rate and regular rhythm. Heart sounds: Normal heart sounds. No murmur. Pulmonary:      Effort: Pulmonary effort is normal.      Breath sounds: Normal breath sounds. No stridor. No wheezing. Musculoskeletal: Normal range of motion. Neurological:      Mental Status: He is alert and oriented to person, place, and time. Psychiatric:         Behavior: Behavior normal.         ASSESSMENT/ PLAN:    1. [facility]  - start metformin, side effects of the medication discussed with the patient. - metFORMIN (GLUCOPHAGE-XR) 500 MG extended release tablet; Take 1 tablet by mouth 2 times daily  Dispense: 60 tablet; Refill: 5  - blood glucose monitor kit and supplies; Use 3-4 times daily  Dispense: 1 kit; Refill: 0  - blood glucose monitor strips; Test 3-4 times a day & as needed for symptoms of irregular blood glucose. Dispense: 100 strip;  Refill: 5  - Glucosource Lancets MISC; Use one 3-4 times to check blood sugar Dispense: 100 each; Refill: 5  - Lipid Panel; Future  - CBC Auto Differential; Future  - Comprehensive Metabolic Panel, Fasting; Future  - POCT glycosylated hemoglobin (Hb A1C)    2. Thyroid disorder screen  - TSH without Reflex; Future  - T4, Free; Future    3. Obesity (BMI 35.0-39.9 without comorbidity)  - encourage loss wt    4. Referred otalgia of right ear  - her already on Augmentin, need to see a dentist for his toothache  - neomycin-polymyxin-hydrocortisone (CORTISPORIN) 3.5-32614-8 otic solution; Place 3 drops into the right ear 3 times daily for 5 days  Dispense: 1 Bottle; Refill: 0              - All old blood work reviewed with the patient  - Appropriate prescription are addressed. - After visit summery provided. - Questions answered and patient verbalizes understanding.  - Call for any problem, questions, or concerns.  - RTC if symptoms worse. Return in about 3 months (around 5/2/2021).

## 2021-02-06 PROBLEM — E11.9 NEW ONSET TYPE 2 DIABETES MELLITUS (HCC): Status: ACTIVE | Noted: 2021-02-06

## 2021-02-06 PROBLEM — E66.9 OBESITY (BMI 35.0-39.9 WITHOUT COMORBIDITY): Status: ACTIVE | Noted: 2021-02-06

## 2021-02-06 ASSESSMENT — ENCOUNTER SYMPTOMS
SINUS PAIN: 0
SINUS PRESSURE: 0
COUGH: 0
RHINORRHEA: 0
ABDOMINAL PAIN: 0
BACK PAIN: 1
SHORTNESS OF BREATH: 0

## 2021-03-23 ENCOUNTER — APPOINTMENT (OUTPATIENT)
Dept: GENERAL RADIOLOGY | Age: 31
End: 2021-03-23
Payer: COMMERCIAL

## 2021-03-23 ENCOUNTER — HOSPITAL ENCOUNTER (EMERGENCY)
Age: 31
Discharge: HOME | End: 2021-03-23
Payer: COMMERCIAL

## 2021-03-23 VITALS
TEMPERATURE: 98.5 F | OXYGEN SATURATION: 99 % | WEIGHT: 296 LBS | RESPIRATION RATE: 23 BRPM | DIASTOLIC BLOOD PRESSURE: 96 MMHG | HEIGHT: 74 IN | BODY MASS INDEX: 37.99 KG/M2 | HEART RATE: 90 BPM | SYSTOLIC BLOOD PRESSURE: 156 MMHG

## 2021-03-23 DIAGNOSIS — M25.532 LEFT WRIST PAIN: Primary | ICD-10-CM

## 2021-03-23 PROCEDURE — 99284 EMERGENCY DEPT VISIT MOD MDM: CPT

## 2021-03-23 PROCEDURE — 73110 X-RAY EXAM OF WRIST: CPT

## 2021-03-23 ASSESSMENT — PAIN SCALES - GENERAL: PAINLEVEL_OUTOF10: 7

## 2021-03-23 NOTE — ED PROVIDER NOTES
Joselyn      PCP: Ashley Mckeon MD    CHIEF COMPLAINT    Chief Complaint   Patient presents with    Wrist Injury     left wrist       This patient was not evaluated by the attending physician. I have independently evaluated this patient. HPI    Natasha Sheffield is a 36 y.o. male who presents with Left wrist pain. Onset was last night. The context is patient states he pulled himself up on a ladder and felt a pop to his left wrist last night at work. The pain is localized to the left distal ulnar region. The pain severity is mild to moderate. The patient has no associated other injuries. The pain is aggravated by wrist movement and direct palpation. No other injuries. No distal numbness, tingling, weakness. REVIEW OF SYSTEMS    General: Denies fever or chills  Cardiac: Denies chest pain or chestwall pain. Pulmonary: Denies shortness of breath  GI: Denies abdominal pain, vomiting, or diarrhea  : No dysuria or hematuria    Denies any other muscles skeletal injuries, including elbow, shoulder,chest wall and back. All other review of systems are negative  See HPI and nursing notes for additional information     PAST MEDICAL & SURGICAL HISTORY    Past Medical History:   Diagnosis Date    Depression 5/31/2016    Essential hypertension 5/31/2016    Hypertriglyceridemia 4/25/2017    [facility] 2/6/2021     Past Surgical History:   Procedure Laterality Date    CHOLECYSTECTOMY, LAPAROSCOPIC  06/12/2018       CURRENT MEDICATIONS    Current Outpatient Rx   Medication Sig Dispense Refill    metFORMIN (GLUCOPHAGE-XR) 500 MG extended release tablet Take 1 tablet by mouth 2 times daily 60 tablet 5    blood glucose monitor kit and supplies Use 3-4 times daily 1 kit 0    blood glucose monitor strips Test 3-4 times a day & as needed for symptoms of irregular blood glucose.  100 strip 5    Glucosource Lancets MISC Use one 3-4 times to check blood sugar 100 each 5    pantoprazole (PROTONIX) 40 MG tablet TAKE 1 TABLET BY MOUTH ONE TIME A DAY 30 tablet 5    cloNIDine (CATAPRES) 0.1 MG/24HR PTWK Place 1 patch onto the skin      topiramate (TOPAMAX) 50 MG tablet Take 1 tablet by mouth 2 times daily 60 tablet 3    ibuprofen (ADVIL;MOTRIN) 800 MG tablet Take 1 tablet by mouth 2 times daily as needed for Pain 60 tablet 3    metoprolol succinate (TOPROL XL) 50 MG extended release tablet Take 1 tablet by mouth daily 90 tablet 3    lisinopril (PRINIVIL;ZESTRIL) 20 MG tablet Take 1 tablet by mouth daily 90 tablet 3    hydrochlorothiazide (HYDRODIURIL) 12.5 MG tablet Take 1 tablet by mouth every morning 90 tablet 3       ALLERGIES    Allergies   Allergen Reactions    Tape Josephine Engle Tape] Rash     And tegaderms       SOCIAL & FAMILY HISTORY    Social History     Socioeconomic History    Marital status:      Spouse name: None    Number of children: None    Years of education: None    Highest education level: None   Occupational History    None   Social Needs    Financial resource strain: None    Food insecurity     Worry: None     Inability: None    Transportation needs     Medical: None     Non-medical: None   Tobacco Use    Smoking status: Current Every Day Smoker     Packs/day: 0.25     Years: 9.00     Pack years: 2.25     Types: E-Cigarettes    Smokeless tobacco: Never Used    Tobacco comment: smokes vape   Substance and Sexual Activity    Alcohol use: Yes     Comment: socially    Drug use: No    Sexual activity: Yes     Partners: Female   Lifestyle    Physical activity     Days per week: None     Minutes per session: None    Stress: None   Relationships    Social connections     Talks on phone: None     Gets together: None     Attends Taoism service: None     Active member of club or organization: None     Attends meetings of clubs or organizations: None     Relationship status: None    Intimate partner violence     Fear of current or ex partner: None     Emotionally abused: None     Physically abused: None     Forced sexual activity: None   Other Topics Concern    None   Social History Narrative    None     Family History   Problem Relation Age of Onset    Diabetes Maternal Uncle     Stroke Maternal Uncle     Cancer Maternal Grandmother     Heart Disease Maternal Grandmother     Stroke Maternal Grandmother     Stroke Maternal Grandfather            PHYSICAL EXAM    VITAL SIGNS: BP (!) 156/96   Pulse 90   Temp 98.5 °F (36.9 °C)   Resp 23   Ht 6' 2\" (1.88 m)   Wt 296 lb (134.3 kg)   SpO2 99%   BMI 38.00 kg/m²   Constitutional:  Well developed, well nourished, no acute distress, non-toxic appearance   HENT:  Atraumatic    Musculoskeletal:    Left  Wrist:  There is no overlying erythema, ecchymosis, swelling. Mild tenderness to distal ulnar aspect of wrist. No snuffbox tenderness. Range of motion intact. Distal sensation and capillary refill intact. Elbow, including radial head is non-tender. No swelling, discoloration, or tenderness to palpation of the ipsilateral elbow and hand/fingers. Distal motor, sensation, capillary refill intact. Integument:  Well hydrated, no rash. skin intact  Vascular: affected extremity distally neurovascularly intact - sensation and capillary refill intact. Neurologic:  Awake alert, normal flow ofspeech   Psychiatric: Cooperative, pleasant affect    RADIOLOGY/PROCEDURES    XR WRIST LEFT (MIN 3 VIEWS)   Final Result   No acute osseous injury left wrist.                 ED COURSE & MEDICAL DECISION MAKING      Patient presents as above. Patient declines pain medication while in emergency department. Left wrist x-ray shows no acute osseous abnormality. No snuffbox tenderness. Distal sensation, capillary refill and radial pulses intact. I discussed imaging results with patient today. Patient provided Velcro wrist splint. Recommend rest, ice, compression, lesion.   Recommend over-the-counter ibuprofen and Tylenol as needed for pain. I recommend follow-up with orthopedist in 1 week if no improvement in pain. Clinical  IMPRESSION    1. Left wrist pain        Diagnosis and plan discussed in detail with patient who understands and agrees. Patient agrees to return emergency department if symptoms worsen or any new symptoms develop. Comment: Please note this report has been produced using speech recognition software and may contain errors related to that system including errors in grammar, punctuation, and spelling, as well as words and phrases that may be inappropriate. If there are any questions or concerns please feel free to contact the dictating provider for clarification.         Jaydon Curtis PA-C  03/23/21 [id number]

## 2021-03-23 NOTE — LETTER
[facility] Emergency Department  [address] [id number]  Phone: [phone]  Fax: [phone]               March 23, 2021    Patient: Catrachita Bustillos   YOB: 1990   Date of Visit: 3/23/2021       To Whom It May Concern:    Catrachita Bustillos was seen and treated in our emergency department on 3/23/2021. He may return to work on 3/25/21.       Sincerely,       Chris Jacques PA-C         Signature:__________________________________

## 2021-05-07 ENCOUNTER — TELEPHONE (OUTPATIENT)
Dept: GASTROENTEROLOGY | Age: 31
End: 2021-05-07

## 2021-05-07 NOTE — TELEPHONE ENCOUNTER
Attempted to contact Pt to schedule appointment, no answer, LVM to return call to office to schedule.

## 2021-05-20 ENCOUNTER — CLINICAL DOCUMENTATION (OUTPATIENT)
Dept: GASTROENTEROLOGY | Age: 31
End: 2021-05-20

## 2021-05-20 NOTE — PROGRESS NOTES
Pt was called 3x to schedule an appointment with no answer. Referral has been closed out, and no further action is required.

## 2021-05-27 ENCOUNTER — OFFICE VISIT (OUTPATIENT)
Dept: FAMILY MEDICINE CLINIC | Age: 31
End: 2021-05-27
Payer: COMMERCIAL

## 2021-05-27 VITALS
SYSTOLIC BLOOD PRESSURE: 119 MMHG | DIASTOLIC BLOOD PRESSURE: 81 MMHG | OXYGEN SATURATION: 98 % | BODY MASS INDEX: 38.54 KG/M2 | WEIGHT: 300.2 LBS | HEART RATE: 85 BPM

## 2021-05-27 DIAGNOSIS — K21.9 GASTROESOPHAGEAL REFLUX DISEASE WITHOUT ESOPHAGITIS: ICD-10-CM

## 2021-05-27 DIAGNOSIS — E66.9 OBESITY (BMI 35.0-39.9 WITHOUT COMORBIDITY): ICD-10-CM

## 2021-05-27 DIAGNOSIS — E11.9 NEW ONSET TYPE 2 DIABETES MELLITUS (HCC): Primary | ICD-10-CM

## 2021-05-27 DIAGNOSIS — I10 ESSENTIAL HYPERTENSION: ICD-10-CM

## 2021-05-27 LAB — HBA1C MFR BLD: 9.4 %

## 2021-05-27 PROCEDURE — 83036 HEMOGLOBIN GLYCOSYLATED A1C: CPT | Performed by: FAMILY MEDICINE

## 2021-05-27 PROCEDURE — G8417 CALC BMI ABV UP PARAM F/U: HCPCS | Performed by: FAMILY MEDICINE

## 2021-05-27 PROCEDURE — 4004F PT TOBACCO SCREEN RCVD TLK: CPT | Performed by: FAMILY MEDICINE

## 2021-05-27 PROCEDURE — 2022F DILAT RTA XM EVC RTNOPTHY: CPT | Performed by: FAMILY MEDICINE

## 2021-05-27 PROCEDURE — 3052F HG A1C>EQUAL 8.0%<EQUAL 9.0%: CPT | Performed by: FAMILY MEDICINE

## 2021-05-27 PROCEDURE — 99214 OFFICE O/P EST MOD 30 MIN: CPT | Performed by: FAMILY MEDICINE

## 2021-05-27 PROCEDURE — G8427 DOCREV CUR MEDS BY ELIG CLIN: HCPCS | Performed by: FAMILY MEDICINE

## 2021-05-27 RX ORDER — INSULIN GLARGINE 100 [IU]/ML
10 INJECTION, SOLUTION SUBCUTANEOUS NIGHTLY
Qty: 5 PEN | Refills: 0 | Status: SHIPPED | OUTPATIENT
Start: 2021-05-27 | End: 2021-06-08

## 2021-05-27 RX ORDER — GLIMEPIRIDE 1 MG/1
1 TABLET ORAL 2 TIMES DAILY
Qty: 60 TABLET | Refills: 3 | Status: SHIPPED | OUTPATIENT
Start: 2021-05-27 | End: 2022-01-19

## 2021-05-27 NOTE — PROGRESS NOTES
Food Insecurity:     Worried About Running Out of Food in the Last Year:     [address] in the Last Year:    Transportation Needs:     Lack of Transportation (Medical):  Lack of Transportation (Non-Medical):    Physical Activity:     Days of Exercise per Week:     Minutes of Exercise per Session:    Stress:     Feeling of Stress :    Social Connections:     Frequency of Communication with Friends and Family:     Frequency of Social Gatherings with Friends and Family:     Attends Mandaen Services:     Active Member of Clubs or Organizations:     Attends Club or Organization Meetings:     Marital Status:    Intimate Partner Violence:     Fear of Current or Ex-Partner:     Emotionally Abused:     Physically Abused:     Sexually Abused: Allergies   Allergen Reactions    Tape [place] Tape] Rash     And tegaderms     Current Outpatient Medications   Medication Sig Dispense Refill    glimepiride (AMARYL) 1 MG tablet Take 1 tablet by mouth 2 times daily 60 tablet 3    insulin glargine (BASAGLAR KWIKPEN) 100 UNIT/ML injection pen Inject 10 Units into the skin nightly 5 pen 0    Insulin Pen Needle 31G X 8 MM MISC 1 each by Does not apply route daily 100 each 3    blood glucose monitor kit and supplies Use 3-4 times daily 1 kit 0    blood glucose monitor strips Test 3-4 times a day & as needed for symptoms of irregular blood glucose.  100 strip 5    Glucosource Lancets MISC Use one 3-4 times to check blood sugar 100 each 5    pantoprazole (PROTONIX) 40 MG tablet TAKE 1 TABLET BY MOUTH ONE TIME A DAY 30 tablet 5    cloNIDine (CATAPRES) 0.1 MG/24HR PTWK Place 1 patch onto the skin      topiramate (TOPAMAX) 50 MG tablet Take 1 tablet by mouth 2 times daily 60 tablet 3    ibuprofen (ADVIL;MOTRIN) 800 MG tablet Take 1 tablet by mouth 2 times daily as needed for Pain 60 tablet 3    metoprolol succinate (TOPROL XL) 50 MG extended release tablet Take 1 tablet by mouth daily 90 tablet 3  lisinopril (PRINIVIL;ZESTRIL) 20 MG tablet Take 1 tablet by mouth daily 90 tablet 3     No current facility-administered medications for this visit. Review of Systems   Constitutional: Negative for activity change, chills and fever. HENT: Negative for congestion. Respiratory: Negative for cough and shortness of breath. Cardiovascular: Negative for chest pain and leg swelling. Gastrointestinal: Negative for abdominal pain, blood in stool, constipation, diarrhea and vomiting. Positive for acid reflux   Genitourinary: Negative for dysuria and flank pain. Neurological: Negative for numbness and headaches. Psychiatric/Behavioral: Negative for agitation and sleep disturbance. The patient is not nervous/anxious.         Lab Results   Component Value Date    WBC 8.7 05/28/2021    HGB 17.1 05/28/2021    HCT 48.5 05/28/2021    MCV 88.0 05/28/2021     05/28/2021     Lab Results   Component Value Date     05/28/2021    K 3.7 05/28/2021    CL 98 (L) 05/28/2021    CO2 26 05/28/2021    BUN 6 05/28/2021    CREATININE 0.7 (L) 05/28/2021    GLUCOSE 116 (H) 09/27/2019    CALCIUM 9.2 05/28/2021    PROT 6.6 05/28/2021    LABALBU 4.2 05/28/2021    BILITOT 0.9 05/28/2021    ALKPHOS 104 05/28/2021    AST 63 (H) 05/28/2021    ALT 96 (H) 05/28/2021    LABGLOM >60 05/28/2021    GFRAA >60 05/28/2021     Lab Results   Component Value Date    CHOL 131 05/28/2021    CHOL 125 06/07/2018    CHOL 135 03/22/2018     Lab Results   Component Value Date    TRIG 173 (H) 05/28/2021    TRIG 91 06/07/2018    TRIG 166 (H) 03/22/2018     Lab Results   Component Value Date    HDL 35 (L) 05/28/2021    HDL 33 (L) 06/07/2018    HDL 33 (L) 03/22/2018     Lab Results   Component Value Date    LDLCALC 32 04/22/2017     Lab Results   Component Value Date    LABA1C 8.7 02/02/2021     Lab Results   Component Value Date    TSHHS 1.650 05/28/2021         /81 (Site: Left Upper Arm, Position: Sitting, Cuff Size: Large Adult) Pulse 85   Wt (!) 300 lb 3.2 oz (136.2 kg)   SpO2 98%   BMI 38.54 kg/m²     BP Readings from Last 3 Encounters:   05/27/21 119/81   03/23/21 (!) 156/96   02/02/21 122/86       Wt Readings from Last 3 Encounters:   05/27/21 (!) 300 lb 3.2 oz (136.2 kg)   03/23/21 296 lb (134.3 kg)   02/02/21 (!) 302 lb 9.6 oz (137.3 kg)         Physical Exam  Constitutional:       General: He is not in acute distress. Appearance: Normal appearance. He is well-developed. He is obese. He is not diaphoretic. HENT:      Head: Normocephalic and atraumatic. Eyes:      Pupils: Pupils are equal, round, and reactive to light. Cardiovascular:      Rate and Rhythm: Normal rate and regular rhythm. Heart sounds: Normal heart sounds. No murmur heard. Pulmonary:      Effort: Pulmonary effort is normal.      Breath sounds: Normal breath sounds. No wheezing. Musculoskeletal:         General: Normal range of motion. Cervical back: Normal range of motion and neck supple. No rigidity. Right lower leg: No edema. Left lower leg: No edema. Neurological:      General: No focal deficit present. Mental Status: He is alert and oriented to person, place, and time. Psychiatric:         Mood and Affect: Mood normal.         Behavior: Behavior normal.         ASSESSMENT/ PLAN:    1. New onset type 2 diabetes mellitus (HCC)  -The A1c is 9.4 so high so patient would like to start insulin   -so we will start insulin and Glimepiride   - glimepiride (AMARYL) 1 MG tablet; Take 1 tablet by mouth 2 times daily  Dispense: 60 tablet; Refill: 3  - insulin glargine (BASAGLAR KWIKPEN) 100 UNIT/ML injection pen; Inject 10 Units into the skin nightly  Dispense: 5 pen; Refill: 0  - Insulin Pen Needle 31G X 8 MM MISC; 1 each by Does not apply route daily  Dispense: 100 each; Refill: 3  - POCT glycosylated hemoglobin (Hb A1C)    2.  Gastroesophageal reflux disease without esophagitis  -Is acting up so we will send him to GI  - Ambulatory referral to Gastroenterology    3. Essential hypertension  -Stable    4. Obesity (BMI 35.0-39.9 without comorbidity)  -encourage weight loss              - All old blood work reviewed with the patient  - Appropriate prescription are addressed. - After visit summery provided. - Questions answered and patient verbalizes understanding.  - Call for any problem, questions, or concerns. Return in about 2 months (around 7/27/2021).

## 2021-05-28 ENCOUNTER — HOSPITAL ENCOUNTER (OUTPATIENT)
Age: 31
Discharge: HOME | End: 2021-05-28
Payer: COMMERCIAL

## 2021-05-28 DIAGNOSIS — Z13.29 THYROID DISORDER SCREEN: ICD-10-CM

## 2021-05-28 DIAGNOSIS — E11.9 NEW ONSET TYPE 2 DIABETES MELLITUS (HCC): ICD-10-CM

## 2021-05-28 LAB
ALBUMIN SERPL-MCNC: 4.2 GM/DL (ref 3.4–5)
ALP BLD-CCNC: 104 IU/L (ref 40–128)
ALT SERPL-CCNC: 96 U/L (ref 10–40)
ANION GAP SERPL CALCULATED.3IONS-SCNC: 11 MMOL/L (ref 4–16)
AST SERPL-CCNC: 63 IU/L (ref 15–37)
BASOPHILS ABSOLUTE: 0.1 K/CU MM
BASOPHILS RELATIVE PERCENT: 0.6 % (ref 0–1)
BILIRUB SERPL-MCNC: 0.9 MG/DL (ref 0–1)
BUN BLDV-MCNC: 6 MG/DL (ref 6–23)
CALCIUM SERPL-MCNC: 9.2 MG/DL (ref 8.3–10.6)
CHLORIDE BLD-SCNC: 98 MMOL/L (ref 99–110)
CHOLESTEROL: 131 MG/DL
CO2: 26 MMOL/L (ref 21–32)
CREAT SERPL-MCNC: 0.7 MG/DL (ref 0.9–1.3)
DIFFERENTIAL TYPE: ABNORMAL
EOSINOPHILS ABSOLUTE: 0.2 K/CU MM
EOSINOPHILS RELATIVE PERCENT: 1.7 % (ref 0–3)
GFR AFRICAN AMERICAN: >60 ML/MIN/1.73M2
GFR NON-AFRICAN AMERICAN: >60 ML/MIN/1.73M2
GLUCOSE FASTING: 311 MG/DL (ref 70–99)
HCT VFR BLD CALC: 48.5 % (ref 42–52)
HDLC SERPL-MCNC: 35 MG/DL
HEMOGLOBIN: 17.1 GM/DL (ref 13.5–18)
IMMATURE NEUTROPHIL %: 0.2 % (ref 0–0.43)
LDL CHOLESTEROL DIRECT: 79 MG/DL
LYMPHOCYTES ABSOLUTE: 1.6 K/CU MM
LYMPHOCYTES RELATIVE PERCENT: 18.8 % (ref 24–44)
MCH RBC QN AUTO: 31 PG (ref 27–31)
MCHC RBC AUTO-ENTMCNC: 35.3 % (ref 32–36)
MCV RBC AUTO: 88 FL (ref 78–100)
MONOCYTES ABSOLUTE: 0.7 K/CU MM
MONOCYTES RELATIVE PERCENT: 8.4 % (ref 0–4)
NUCLEATED RBC %: 0 %
PDW BLD-RTO: 12.2 % (ref 11.7–14.9)
PLATELET # BLD: 230 K/CU MM (ref 140–440)
PMV BLD AUTO: 11.5 FL (ref 7.5–11.1)
POTASSIUM SERPL-SCNC: 3.7 MMOL/L (ref 3.5–5.1)
RBC # BLD: 5.51 M/CU MM (ref 4.6–6.2)
SEGMENTED NEUTROPHILS ABSOLUTE COUNT: 6.1 K/CU MM
SEGMENTED NEUTROPHILS RELATIVE PERCENT: 70.3 % (ref 36–66)
SODIUM BLD-SCNC: 135 MMOL/L (ref 135–145)
T4 FREE: 1.55 NG/DL (ref 0.9–1.8)
TOTAL IMMATURE NEUTOROPHIL: 0.02 K/CU MM
TOTAL NUCLEATED RBC: 0 K/CU MM
TOTAL PROTEIN: 6.6 GM/DL (ref 6.4–8.2)
TRIGL SERPL-MCNC: 173 MG/DL
TSH HIGH SENSITIVITY: 1.65 UIU/ML (ref 0.27–4.2)
WBC # BLD: 8.7 K/CU MM (ref 4–10.5)

## 2021-05-28 PROCEDURE — 85025 COMPLETE CBC W/AUTO DIFF WBC: CPT

## 2021-05-28 PROCEDURE — 36415 COLL VENOUS BLD VENIPUNCTURE: CPT

## 2021-05-28 PROCEDURE — 80053 COMPREHEN METABOLIC PANEL: CPT

## 2021-05-28 PROCEDURE — 80061 LIPID PANEL: CPT

## 2021-05-28 PROCEDURE — 83721 ASSAY OF BLOOD LIPOPROTEIN: CPT

## 2021-05-28 PROCEDURE — 84443 ASSAY THYROID STIM HORMONE: CPT

## 2021-05-28 PROCEDURE — 84439 ASSAY OF FREE THYROXINE: CPT

## 2021-05-29 ASSESSMENT — ENCOUNTER SYMPTOMS
CONSTIPATION: 0
COUGH: 0
DIARRHEA: 0
SHORTNESS OF BREATH: 0
ABDOMINAL PAIN: 0
BLOOD IN STOOL: 0
VOMITING: 0

## 2021-06-01 ENCOUNTER — TELEPHONE (OUTPATIENT)
Dept: FAMILY MEDICINE CLINIC | Age: 31
End: 2021-06-01

## 2021-06-08 RX ORDER — INSULIN GLARGINE 100 [IU]/ML
10 INJECTION, SOLUTION SUBCUTANEOUS NIGHTLY
Qty: 5 PEN | Refills: 3 | Status: SHIPPED | OUTPATIENT
Start: 2021-06-08 | End: 2021-06-29 | Stop reason: SDUPTHER

## 2021-06-29 RX ORDER — INSULIN GLARGINE 100 [IU]/ML
60 INJECTION, SOLUTION SUBCUTANEOUS NIGHTLY
Qty: 5 PEN | Refills: 3 | Status: SHIPPED | OUTPATIENT
Start: 2021-06-29 | End: 2021-11-01

## 2021-06-29 NOTE — TELEPHONE ENCOUNTER
Patient needing Lantus reordered. Patient is now using Lantus 60 units nightly instead of the 10 we have recorded.  Please Advise

## 2021-07-09 ENCOUNTER — TELEPHONE (OUTPATIENT)
Dept: FAMILY MEDICINE CLINIC | Age: 31
End: 2021-07-09

## 2021-07-09 NOTE — TELEPHONE ENCOUNTER
Patient pharmacy called needing clarification on the Dexcom reader and sensor PA. Advised that PA completed and they the Rx is a covered benefit and what copay or cost amount there is he will have to pay out of pocket. Pharmacy later called back states that scripts are still going PA. Reviewed PA again \"Prior Authorization not required for patient/medication. Available at a higher copay\" advised that this to me means that patient can use this device but whatever the cost it is they will not cover he'll have to pay out of pocket. I also advised we could try something like the [place] but these devices are notoriously hard to get covered. Advised pharmacy and they state they will inform the patient.

## 2021-07-19 ENCOUNTER — OFFICE VISIT (OUTPATIENT)
Dept: FAMILY MEDICINE CLINIC | Age: 31
End: 2021-07-19
Payer: COMMERCIAL

## 2021-07-19 VITALS
OXYGEN SATURATION: 98 % | DIASTOLIC BLOOD PRESSURE: 82 MMHG | BODY MASS INDEX: 39.31 KG/M2 | SYSTOLIC BLOOD PRESSURE: 122 MMHG | HEART RATE: 81 BPM | WEIGHT: 306.2 LBS

## 2021-07-19 DIAGNOSIS — E11.9 NEW ONSET TYPE 2 DIABETES MELLITUS (HCC): Primary | ICD-10-CM

## 2021-07-19 DIAGNOSIS — M25.561 CHRONIC PAIN OF BOTH KNEES: ICD-10-CM

## 2021-07-19 DIAGNOSIS — K21.9 GASTROESOPHAGEAL REFLUX DISEASE WITHOUT ESOPHAGITIS: ICD-10-CM

## 2021-07-19 DIAGNOSIS — F32.A ANXIETY AND DEPRESSION: ICD-10-CM

## 2021-07-19 DIAGNOSIS — Z23 NEED FOR HEPATITIS B VACCINATION: ICD-10-CM

## 2021-07-19 DIAGNOSIS — L30.9 DERMATITIS: ICD-10-CM

## 2021-07-19 DIAGNOSIS — M25.562 CHRONIC PAIN OF BOTH KNEES: ICD-10-CM

## 2021-07-19 DIAGNOSIS — G89.29 CHRONIC PAIN OF BOTH KNEES: ICD-10-CM

## 2021-07-19 DIAGNOSIS — F41.9 ANXIETY AND DEPRESSION: ICD-10-CM

## 2021-07-19 LAB — HBA1C MFR BLD: 8.1 %

## 2021-07-19 PROCEDURE — 90746 HEPB VACCINE 3 DOSE ADULT IM: CPT | Performed by: FAMILY MEDICINE

## 2021-07-19 PROCEDURE — G8427 DOCREV CUR MEDS BY ELIG CLIN: HCPCS | Performed by: FAMILY MEDICINE

## 2021-07-19 PROCEDURE — 2022F DILAT RTA XM EVC RTNOPTHY: CPT | Performed by: FAMILY MEDICINE

## 2021-07-19 PROCEDURE — G8417 CALC BMI ABV UP PARAM F/U: HCPCS | Performed by: FAMILY MEDICINE

## 2021-07-19 PROCEDURE — 83036 HEMOGLOBIN GLYCOSYLATED A1C: CPT | Performed by: FAMILY MEDICINE

## 2021-07-19 PROCEDURE — 4004F PT TOBACCO SCREEN RCVD TLK: CPT | Performed by: FAMILY MEDICINE

## 2021-07-19 PROCEDURE — 3052F HG A1C>EQUAL 8.0%<EQUAL 9.0%: CPT | Performed by: FAMILY MEDICINE

## 2021-07-19 PROCEDURE — 99214 OFFICE O/P EST MOD 30 MIN: CPT | Performed by: FAMILY MEDICINE

## 2021-07-19 RX ORDER — SERTRALINE HYDROCHLORIDE 25 MG/1
25 TABLET, FILM COATED ORAL DAILY
Qty: 30 TABLET | Refills: 3 | Status: SHIPPED | OUTPATIENT
Start: 2021-07-19 | End: 2022-01-19

## 2021-07-19 RX ORDER — TRIAMCINOLONE ACETONIDE 1 MG/G
CREAM TOPICAL
Qty: 30 G | Refills: 1 | Status: SHIPPED | OUTPATIENT
Start: 2021-07-19 | End: 2022-10-13

## 2021-07-19 RX ORDER — PANTOPRAZOLE SODIUM 40 MG/1
40 TABLET, DELAYED RELEASE ORAL 2 TIMES DAILY
Qty: 60 TABLET | Refills: 5 | Status: SHIPPED | OUTPATIENT
Start: 2021-07-19 | End: 2022-05-06

## 2021-07-19 ASSESSMENT — ENCOUNTER SYMPTOMS
CONSTIPATION: 0
BLOOD IN STOOL: 0
COUGH: 0
ABDOMINAL PAIN: 0
DIARRHEA: 0
VOMITING: 0
SHORTNESS OF BREATH: 0

## 2021-07-19 NOTE — PROGRESS NOTES
 Alcohol use: Yes     Comment: socially    Drug use: No    Sexual activity: Yes     Partners: Female   Other Topics Concern    Not on file   Social History Narrative    Not on file     Social Determinants of Health     Financial Resource Strain:     Difficulty of Paying Living Expenses:    Food Insecurity:     Worried About Running Out of Food in the Last Year:     [address] in the Last Year:    Transportation Needs:     Lack of Transportation (Medical):  Lack of Transportation (Non-Medical):    Physical Activity:     Days of Exercise per Week:     Minutes of Exercise per Session:    Stress:     Feeling of Stress :    Social Connections:     Frequency of Communication with Friends and Family:     Frequency of Social Gatherings with Friends and Family:     Attends Sikh Services:     Active Member of Clubs or Organizations:     Attends Club or Organization Meetings:     Marital Status:    Intimate Partner Violence:     Fear of Current or Ex-Partner:     Emotionally Abused:     Physically Abused:     Sexually Abused: Allergies   Allergen Reactions    Tape Claudy Fried Tape] Rash     And tegaderms     Current Outpatient Medications   Medication Sig Dispense Refill    Insulin Pen Needle 29G X 12MM MISC 1 each by Does not apply route daily 100 each 3    triamcinolone (KENALOG) 0.1 % cream Apply topically 2 times daily.  30 g 1    sertraline (ZOLOFT) 25 MG tablet Take 1 tablet by mouth daily 30 tablet 3    pantoprazole (PROTONIX) 40 MG tablet Take 1 tablet by mouth 2 times daily TAKE 1 TABLET BY MOUTH ONE TIME A DAY 60 tablet 5    Continuous Blood Gluc  (DEXCOM G6 ) WILLIAN Check BS 4 times daily 1 Device 0    Continuous Blood Gluc Sensor (DEXCOM G6 SENSOR) MISC Check BS 4-5 times daily 1 each 5    metoprolol succinate (TOPROL XL) 50 MG extended release tablet Take 1 tablet by mouth daily REFILL ONE TIME UNTIL SEES DR 90 tablet 0    lisinopril (PRINIVIL;ZESTRIL) 20 MG tablet Take 1 tablet by mouth daily REFILLED 1 TIME UNTIL SEES  90 tablet 0    insulin glargine (LANTUS SOLOSTAR) 100 UNIT/ML injection pen Inject 60 Units into the skin nightly 5 pen 3    glimepiride (AMARYL) 1 MG tablet Take 1 tablet by mouth 2 times daily 60 tablet 3    blood glucose monitor kit and supplies Use 3-4 times daily 1 kit 0    blood glucose monitor strips Test 3-4 times a day & as needed for symptoms of irregular blood glucose. 100 strip 5    Glucosource Lancets MISC Use one 3-4 times to check blood sugar 100 each 5    cloNIDine (CATAPRES) 0.1 MG/24HR PTWK Place 1 patch onto the skin      topiramate (TOPAMAX) 50 MG tablet Take 1 tablet by mouth 2 times daily 60 tablet 3    ibuprofen (ADVIL;MOTRIN) 800 MG tablet Take 1 tablet by mouth 2 times daily as needed for Pain 60 tablet 3    insulin lispro, 1 Unit Dial, (HUMALOG KWIKPEN) 100 UNIT/ML SOPN Inject 5 Units into the skin 3 times daily (before meals) 5 pen 5     No current facility-administered medications for this visit. Review of Systems   Constitutional: Negative for activity change, chills and fever. HENT: Negative for congestion. Respiratory: Negative for cough and shortness of breath. Cardiovascular: Negative for chest pain and leg swelling. Gastrointestinal: Negative for abdominal pain, blood in stool, constipation, diarrhea and vomiting. Positive for acid reflux   Genitourinary: Negative for dysuria and flank pain. Musculoskeletal: Positive for arthralgias (Both knees and both ankles). Skin: Positive for rash. Neurological: Negative for numbness and headaches. Psychiatric/Behavioral: Positive for agitation, dysphoric mood and sleep disturbance. Negative for decreased concentration. The patient is nervous/anxious.         Lab Results   Component Value Date    WBC 8.7 05/28/2021    HGB 17.1 05/28/2021    HCT 48.5 05/28/2021    MCV 88.0 05/28/2021     05/28/2021     Lab range of motion. Cervical back: Normal range of motion and neck supple. No rigidity. Right lower leg: No edema. Left lower leg: No edema. Comments: Both knees exam are normal   Skin:     Comments: Erythematous scaly rash on both side of the face at the site of the glasses   Neurological:      General: No focal deficit present. Mental Status: He is alert and oriented to person, place, and time. Psychiatric:         Mood and Affect: Mood normal.         Behavior: Behavior normal.         Thought Content: Thought content normal.         Judgment: Judgment normal.         ASSESSMENT/ PLAN:    1. New onset type 2 diabetes mellitus ([facility] [address]  - getting better  - POCT glycosylated hemoglobin (Hb A1C)  - Insulin Pen Needle 29G X 12MM MISC; 1 each by Does not apply route daily  Dispense: 100 each; Refill: 3    2. Gastroesophageal reflux disease without esophagitis  - [facility] - James Peterson MD, Gastroenterology, [place]  - pantoprazole (PROTONIX) 40 MG tablet; Take 1 tablet by mouth 2 times daily TAKE 1 TABLET BY MOUTH ONE TIME A DAY  Dispense: 60 tablet; Refill: 5    3. Chronic pain of both knees  - XR KNEE RIGHT (3 VIEWS); Future  - XR KNEE LEFT (3 VIEWS); Future    4. Anxiety and depression  - start:  - sertraline (ZOLOFT) 25 MG tablet; Take 1 tablet by mouth daily  Dispense: 30 tablet; Refill: 3    5. Dermatitis  - start:  - triamcinolone (KENALOG) 0.1 % cream; Apply topically 2 times daily. Dispense: 30 g; Refill: 1    6. Need for hepatitis B vaccination  - Hep B Vaccine Adult (ENGERIX-B)              - All old blood work reviewed with the patient  - Appropriate prescription are addressed. - After visit summery provided. - Questions answered and patient verbalizes understanding.  - Call for any problem, questions, or concerns.  - RTC if symptoms worse. Return in about 3 months (around 10/19/2021).

## 2021-07-21 ENCOUNTER — TELEPHONE (OUTPATIENT)
Dept: FAMILY MEDICINE CLINIC | Age: 31
End: 2021-07-21

## 2021-08-02 ENCOUNTER — TELEPHONE (OUTPATIENT)
Dept: FAMILY MEDICINE CLINIC | Age: 31
End: 2021-08-02

## 2021-08-23 ENCOUNTER — PATIENT MESSAGE (OUTPATIENT)
Dept: FAMILY MEDICINE CLINIC | Age: 31
End: 2021-08-23

## 2021-08-23 NOTE — TELEPHONE ENCOUNTER
From: Nydia Wooten  To: Eloisa Boyd MD  Sent: 8/23/2021 3:36 PM EDT  Subject: Non-Urgent Medical Question    The Zoloft isn't really doing anything so I have stopped taking it. I was told to let dr Fern Henderson know if it was doing anything for me or not. Also gastro would like to know if y'all can do a lipid panel and check my thyroid. They were afraid the insurance wouldn't cover it.

## 2021-08-30 NOTE — TELEPHONE ENCOUNTER
In regards to anxiety   Dr. Digna Feliciano started Zoloft 25 mg once daily July 19, 2021. The dosage could have been increased or can be changed to a different medicine. If the patient would like to be medicated, please schedule him with me virtually tomorrow or he can follow-up with Dr. Digna Feliciano when she returns or he can be seen at [facility] walk-in or [facility]. In regards to his labs-  Lipid panel and thyroid studies were completed and May 2021.   Thyroid stimulating hormone was normal.  Total cholesterol 131  Triglycerides 173  HDL 35  LDL 79

## 2021-08-30 NOTE — TELEPHONE ENCOUNTER
Catapres patch has been given once by urology September 2020  Hydrochlorothiazide 12.5 mg has not been given since September 2019- not on dr Aparna Bunch most recent visit note  Topamax has not been given since July 2020    is he taking these consistently?

## 2021-09-01 RX ORDER — TOPIRAMATE 50 MG/1
50 TABLET, FILM COATED ORAL 2 TIMES DAILY
Qty: 60 TABLET | Refills: 0 | Status: SHIPPED | OUTPATIENT
Start: 2021-09-01 | End: 2021-12-02

## 2021-09-01 RX ORDER — HYDROCHLOROTHIAZIDE 12.5 MG/1
12.5 TABLET ORAL EVERY MORNING
Qty: 30 TABLET | Refills: 0 | Status: SHIPPED | OUTPATIENT
Start: 2021-09-01 | End: 2022-10-13

## 2021-09-01 RX ORDER — CLONIDINE 0.1 MG/24H
PATCH, EXTENDED RELEASE TRANSDERMAL
Qty: 4 PATCH | Status: CANCELLED | OUTPATIENT
Start: 2021-09-01

## 2021-09-01 RX ORDER — CLONIDINE HYDROCHLORIDE 0.1 MG/1
TABLET ORAL
Qty: 60 TABLET | Refills: 0 | Status: SHIPPED | OUTPATIENT
Start: 2021-09-01 | End: 2021-09-17 | Stop reason: SDUPTHER

## 2021-09-01 NOTE — TELEPHONE ENCOUNTER
Clonidine 0.1 mg is a tablet not a patch. And the topimax is for headaches. Pt states he has been taking this everyday. Also been taking HCTZ QD.  Pt states that provider never told him to stop medication please advise

## 2021-09-01 NOTE — TELEPHONE ENCOUNTER
Writer spoke with patient. Clonidine is oral twice daily. Hydrochlorothiazide is once daily. Topamax is twice daily. Patient states medications look inconsistent because he is inconsistent with taking his medicine. Advised that provider will fill for 30 days. Follow-up with PCP.     Covering provider  ARTEMIO Barboza NP

## 2021-09-07 PROBLEM — R53.1 WEAKNESS: Status: ACTIVE | Noted: 2021-09-07

## 2021-12-08 ENCOUNTER — PATIENT MESSAGE (OUTPATIENT)
Dept: FAMILY MEDICINE CLINIC | Age: 31
End: 2021-12-08

## 2021-12-08 DIAGNOSIS — E11.9 NEW ONSET TYPE 2 DIABETES MELLITUS (HCC): Primary | ICD-10-CM

## 2021-12-09 NOTE — TELEPHONE ENCOUNTER
From: Zeyad Castellano  To: Dr. Cristhian Booth: 12/8/2021 9:58 PM EST  Subject: Non-Urgent Medical Question    Can I be referred to a diabetes doctor?

## 2021-12-28 ENCOUNTER — TELEPHONE (OUTPATIENT)
Dept: FAMILY MEDICINE CLINIC | Age: 31
End: 2021-12-28

## 2022-01-31 ENCOUNTER — VIRTUAL VISIT (OUTPATIENT)
Dept: FAMILY MEDICINE CLINIC | Age: 32
End: 2022-01-31
Payer: COMMERCIAL

## 2022-01-31 DIAGNOSIS — R05.9 COUGH: ICD-10-CM

## 2022-01-31 DIAGNOSIS — U09.9 POST-COVID SYNDROME: Primary | ICD-10-CM

## 2022-01-31 PROCEDURE — G8427 DOCREV CUR MEDS BY ELIG CLIN: HCPCS | Performed by: FAMILY MEDICINE

## 2022-01-31 PROCEDURE — 99213 OFFICE O/P EST LOW 20 MIN: CPT | Performed by: FAMILY MEDICINE

## 2022-01-31 RX ORDER — BENZONATATE 100 MG/1
100 CAPSULE ORAL 2 TIMES DAILY PRN
Qty: 20 CAPSULE | Refills: 0 | Status: SHIPPED | OUTPATIENT
Start: 2022-01-31 | End: 2022-02-07

## 2022-01-31 RX ORDER — ALBUTEROL SULFATE 90 UG/1
2 AEROSOL, METERED RESPIRATORY (INHALATION) 4 TIMES DAILY PRN
Qty: 54 G | Refills: 5 | Status: SHIPPED | OUTPATIENT
Start: 2022-01-31

## 2022-01-31 ASSESSMENT — ENCOUNTER SYMPTOMS
WHEEZING: 0
COUGH: 1
SHORTNESS OF BREATH: 0
STRIDOR: 0
NAUSEA: 0
VOMITING: 0
CONSTIPATION: 0
ABDOMINAL PAIN: 0
DIARRHEA: 0

## 2022-02-01 ENCOUNTER — TELEPHONE (OUTPATIENT)
Dept: FAMILY MEDICINE CLINIC | Age: 32
End: 2022-02-01

## 2022-02-01 NOTE — TELEPHONE ENCOUNTER
Albuterol  Request for PA recieved via fax. Accessed chart to don PA need.  Outcome:  PA needed & initiated awaiting response

## 2022-04-09 ENCOUNTER — APPOINTMENT (OUTPATIENT)
Dept: GENERAL RADIOLOGY | Age: 32
End: 2022-04-09
Payer: COMMERCIAL

## 2022-04-09 ENCOUNTER — HOSPITAL ENCOUNTER (EMERGENCY)
Age: 32
Discharge: HOME | End: 2022-04-09
Payer: COMMERCIAL

## 2022-04-09 VITALS
TEMPERATURE: 97.7 F | HEART RATE: 79 BPM | OXYGEN SATURATION: 100 % | SYSTOLIC BLOOD PRESSURE: 153 MMHG | DIASTOLIC BLOOD PRESSURE: 105 MMHG | RESPIRATION RATE: 18 BRPM

## 2022-04-09 DIAGNOSIS — M25.512 ACUTE PAIN OF LEFT SHOULDER: Primary | ICD-10-CM

## 2022-04-09 DIAGNOSIS — M25.522 LEFT ELBOW PAIN: ICD-10-CM

## 2022-04-09 PROCEDURE — 73030 X-RAY EXAM OF SHOULDER: CPT

## 2022-04-09 PROCEDURE — 99284 EMERGENCY DEPT VISIT MOD MDM: CPT

## 2022-04-09 PROCEDURE — 73080 X-RAY EXAM OF ELBOW: CPT

## 2022-04-09 RX ORDER — METHOCARBAMOL 500 MG/1
500 TABLET, FILM COATED ORAL 3 TIMES DAILY
Qty: 15 TABLET | Refills: 0 | Status: SHIPPED | OUTPATIENT
Start: 2022-04-09 | End: 2022-04-14

## 2022-04-09 RX ORDER — METHYLPREDNISOLONE 4 MG/1
TABLET ORAL
Qty: 21 TABLET | Refills: 0 | Status: SHIPPED | OUTPATIENT
Start: 2022-04-09 | End: 2022-10-13

## 2022-04-09 NOTE — ED PROVIDER NOTES
eMERGENCY dEPARTMENT eNCOUnter         [address] [facility]    PCP: Taco Loza MD    CHIEF COMPLAINT    Chief Complaint   Patient presents with    Arm Injury     and shoulder injury at work 2 days ago       HPI    Deb Anderson is a 29 y.o. male who presents with left shoulder and elbow pain. Onset is prior to arrival, x2 days ago. 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 He is localizing pain over the anterolateral left shoulder and lateral elbow, worse with shoulder movement especially lifting arms above head as well as complete elbow extension. Denies any fall or blunt trauma to the elbow or arm prior to onset of pain. No previous history of left upper arm fracture surgery. Denies any associated neck pain, weakness or numbness into the left hand. He is right-handed. He is a diabetic, not on insulin, and is diet controlled. No history of IV drug use, fever or chills. Denying any chest pain or shortness of breath. Denies any history of TAA/AAA or coronary artery disease. Not tried any over-the-counter medications relief of symptoms. REVIEW OF SYSTEMS    General: Denies fever or chills  Cardiac: Denies chest pain  Pulmonary: Denies shortness of breath, wheezes, or difficulty breathing  GI: Denies abdominal pain, vomiting, or diarrhea  : No dysuria or hematuria    Denies any other muscles skeletal injuries, including chest wall and back.     All other review of systems are negative  See HPI and nursing notes for additional information     PAST MEDICAL & SURGICAL HISTORY    Past Medical History:   Diagnosis Date    Depression 5/31/2016    Essential hypertension 5/31/2016    Hypertriglyceridemia 4/25/2017    New onset type 2 diabetes mellitus ([facility] [address] 2/6/2021     Past Surgical History:   Procedure Laterality Date    CHOLECYSTECTOMY, LAPAROSCOPIC  06/12/2018       CURRENT MEDICATIONS    Current Outpatient Rx   Medication Sig Dispense Refill    methylPREDNISolone (MEDROL, HARJINDER,) 4 MG tablet Take by mouth as directed on package 21 tablet 0    methocarbamol (ROBAXIN) 500 MG tablet Take 1 tablet by mouth 3 times daily for 5 days 15 tablet 0    albuterol sulfate  (90 Base) MCG/ACT inhaler Inhale 2 puffs into the lungs 4 times daily as needed for Wheezing 54 g 5    glimepiride (AMARYL) 1 MG tablet TAKE 1 TABLET BY MOUTH TWO TIMES A DAY 60 tablet 5    sertraline (ZOLOFT) 25 MG tablet TAKE 1 TABLET BY MOUTH EVERY DAY 30 tablet 5    topiramate (TOPAMAX) 50 MG tablet TAKE 1 TABLET BY MOUTH TWO TIMES DAILY 60 tablet 5    LANTUS SOLOSTAR 100 UNIT/ML injection pen INJECT 60 UNITS INTO THE SKIN NIGHTLY 15 mL 5    cloNIDine (CATAPRES) 0.1 MG tablet TAKE ONE TABLET BY MOUTH TWICE DAILY 180 tablet 3    metoprolol succinate (TOPROL XL) 50 MG extended release tablet Take 1 tablet by mouth daily REFILL ONE TIME UNTIL SEES DR 90 tablet 3    lisinopril (PRINIVIL;ZESTRIL) 20 MG tablet Take 1 tablet by mouth daily REFILLED 1 TIME UNTIL SEES DR. 90 tablet 3    dilTIAZem (CARDIZEM CD) 120 MG extended release capsule Take 1 capsule by mouth daily 90 capsule 3    hydroCHLOROthiazide (HYDRODIURIL) 12.5 MG tablet Take 1 tablet by mouth every morning 30 tablet 0    Insulin Pen Needle 29G X 12MM MISC 1 each by Does not apply route daily 100 each 3    triamcinolone (KENALOG) 0.1 % cream Apply topically 2 times daily.  30 g 1    pantoprazole (PROTONIX) 40 MG tablet Take 1 tablet by mouth 2 times daily TAKE 1 TABLET BY MOUTH ONE TIME A DAY 60 tablet 5    Continuous Blood Gluc  (DEXCOM G6 ) WILLIAN Check BS 4 times daily 1 Device 0    Continuous Blood Gluc Sensor (DEXCOM G6 SENSOR) MISC Check BS 4-5 times daily 1 each 5    insulin lispro, 1 Unit Dial, (HUMALOG KWIKPEN) 100 UNIT/ML SOPN Inject 5 Units into the skin 3 times daily (before meals) 5 pen 5    blood glucose monitor kit and supplies Use 3-4 times daily 1 kit 0    blood glucose monitor strips Test 3-4 times a day & as needed for symptoms of irregular blood glucose. 100 strip 5    Glucosource Lancets MISC Use one 3-4 times to check blood sugar 100 each 5    ibuprofen (ADVIL;MOTRIN) 800 MG tablet Take 1 tablet by mouth 2 times daily as needed for Pain (Patient not taking: Reported on 9/7/2021) 60 tablet 3       ALLERGIES    Allergies   Allergen Reactions    Tape Candice Blonder Tape] Rash     And tegaderms       SOCIAL & FAMILY HISTORY    Social History     Socioeconomic History    Marital status:      Spouse name: None    Number of children: None    Years of education: None    Highest education level: None   Occupational History    None   Tobacco Use    Smoking status: Current Every Day Smoker     Packs/day: 0.25     Years: 9.00     Pack years: 2.25     Types: E-Cigarettes    Smokeless tobacco: Never Used    Tobacco comment: smokes vape   Vaping Use    Vaping Use: Every day    Substances: Always   Substance and Sexual Activity    Alcohol use: Yes     Comment: socially    Drug use: No    Sexual activity: Yes     Partners: Female   Other Topics Concern    None   Social History Narrative    None     Social Determinants of Health     Financial Resource Strain:     Difficulty of Paying Living Expenses: Not on file   Food Insecurity:     Worried About Running Out of Food in the Last Year: Not on file    Lamonte of Food in the Last Year: Not on file   Transportation Needs:     Lack of Transportation (Medical): Not on file    Lack of Transportation (Non-Medical):  Not on file   Physical Activity:     Days of Exercise per Week: Not on file    Minutes of Exercise per Session: Not on file   Stress:     Feeling of Stress : Not on file   Social Connections:     Frequency of Communication with Friends and Family: Not on file    Frequency of Social Gatherings with Friends and Family: Not on file    Attends Latter-day Services: Not on file    Active Member of Clubs or Organizations: Not on file    Attends Club or Organization Meetings: Not on file    Marital Status: Not on file   Intimate Partner Violence:     Fear of Current or Ex-Partner: Not on file    Emotionally Abused: Not on file    Physically Abused: Not on file    Sexually Abused: Not on file   Housing Stability:     Unable to Pay for Housing in the Last Year: Not on file    Number of [place] in the Last Year: Not on file    Unstable Housing in the Last Year: Not on file     Family History   Problem Relation Age of Onset    Diabetes Maternal Uncle     Stroke Maternal Uncle     Cancer Maternal Grandmother     Heart Disease Maternal Grandmother     Stroke Maternal Grandmother     Stroke Maternal Grandfather            PHYSICAL EXAM    VITAL SIGNS: BP (!) 153/105   Pulse 79   Temp 97.7 °F (36.5 °C) (Oral)   Resp 18   SpO2 100%   Constitutional:  Well developed, well nourished. Appears comfortable  HENT:  Atraumatic, Normocephalic, EOMIs, conjunctiva clear, nasal bones midline  Musculoskeletal:    Neck: No gross swelling or discoloration on inspection. No tenderness to palpation or palpable defect. Full range of motion without pain. Left Shoulder/elbow Exam:   -Inspection:   No obvious defects, deformities, or discoloration. Skin intact. No sulcus sign   - Palpation: No swelling     No redness, or warmth     + Mild tenderness palpation over the anterolateral left shoulder, greatest over the [facility] joint and anterior glenohumeral joint line as well as lateral left elbow without palpable near soft tissue defects. No palpable localized tenderness over the radial head. No tenderness or palpable crepitus of the clavicle. Compartments are soft throughout the left upper extremity. -ROM:   Limited ROM due to pain especially and limits of forward flexion, abduction and internal rotation. . Rotator cuff strength testing is 4/5 against resistance. Full range of motion of the left elbow with increased pain on complete extension and supination against resistance   -Provocative tests:  + Neer/ Gomez, Negative Drop Arm. No swelling, discoloration, tenderness to palpation, or range of motion deficit of the ipsilateral elbow. Vascular: Distal pulses intact   Integument:  Well hydrated, no rash   Neurologic:  Alert and oriented. Distal sensation intact. No functional deficits of elbows, wrists, hands, or fingers. 5/5  strength of the ipsilateral hand. Equal sensation over the bilateral deltoids and distally. No wrist drop. DTRs and distal sensation equal/intact. Psychiatric: Cooperative, pleasant affect        RADIOLOGY/PROCEDURES            XR ELBOW LEFT (MIN 3 VIEWS) (Final result)  Result time 04/09/22 13:41:27  Final result by Anoop Amaya MD (04/09/22 13:41:27)                Impression:    1. No acute fracture or malalignment. Narrative:    EXAMINATION:   THREE XRAY VIEWS OF THE LEFT ELBOW     4/9/2022 1:37 pm     COMPARISON:   None. 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 No significant degenerative changes. Soft tissues unremarkable.                       XR SHOULDER LEFT (MIN 2 VIEWS) (Final result)  Result time 04/09/22 13:42:31  Final result by Anoop Amaya MD (04/09/22 13:42:31)                Impression:    1. No acute fracture or malalignment. Narrative:    EXAMINATION:   THREE XRAY VIEWS OF THE LEFT SHOULDER     4/9/2022 1:37 pm     COMPARISON:   None.      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 No significant degenerative changes. Soft tissues unremarkable.                           ED COURSE & MEDICAL DECISION MAKING       Vital signs and nursing notes reviewed during ED course. I have independently evaluated this patient . Supervising MD - Dr. Wendi Ramsay - present in the Emergency Department, available for consultation, throughout entirety of  patient care. All pertinent Lab data and radiographic results reviewed with patient at bedside. The patient and/or the family were informed of the results of any tests/labs/imaging, the treatment plan, and time was allotted to answer questions. Differential diagnosis: includes but not limited to ligamentous injury, tendon injury, soft tissue contusion/hematoma, fracture, dislocation, Infection, Neurologic Deficit/Injury, Labral injury, Rotator cuff injury, fracture, dislocation. Clinical  IMPRESSION    1. Acute pain of left shoulder    2. Left elbow pain          Patient presents with left shoulder and elbow pain after lifting injuries at work. Patient is claiming Worker's Comp. On exam, pleasant nontoxic 44-year-old male, elevated BMI, nontoxic. No gross bony deformities of the left upper extremity, he is neurovascularly intact distally with soft compartments. Full range of motion of the left shoulder and elbow but increased pain at the elements of forward flexion abduction against resistance as well as rotator cuff strength testing. No wrist drop. 5/5  strength. No neck pain or range of motion deficits. X-ray of left shoulder and left elbow reveals no evidence of fracture or malalignment. At this time, suspect likely soft tissue injury including tendinitis versus cuff injury. We discussed supportive symptomatic care and outpatient follow-up with occupational health as well as his orthopedist, Dr Conrad López.  Low clinical suspicion for ischemic limb, compartment syndrome, intra-articular joint infection/septic arthritis, DVT, osteomyelitis, arterial/neurologic injury, necrotizing fasciitis, or infected/rapidly expanding hematoma. Patient is discharged in stable condition. Educated on [facility] therapy. Provided prescription for Medrol Dosepak and muscle relaxer. Patient is requesting a sling for comfort which is provided today but did educate him to come out of sling every 1-2 hours for rigorous ice applications and passive range of motion stretches of the shoulder and elbow including pendulum swings. Patient is instructed to followup with orthopedics in 7-10 days, sooner with worsened symptoms. Return precautions back to the ED discussed for any new or worsening symptoms. Diagnosis and plan discussed in detail with patient who understands and agrees. Patient agrees to return emergency department if symptoms worsen or any new symptoms develop. Comment: Please note this report has been produced using speech recognition software and may contain errors related to that system including errors in grammar, punctuation, and spelling, as well as words and phrases that may be inappropriate. If there are any questions or concerns please feel free to contact the dictating provider for clarification.         Stewart Kline PA-C  04/09/22 [id number]

## 2022-04-09 NOTE — ACP (ADVANCE CARE PLANNING)
Patient does not have any ACP documents/Medical Power of . LSW notes hospital will follow [place]'s Next of Kin hierarchy in the following descending order for priority:    Guardian  Spouse  [de-identified] of adult Children  Parents  [de-identified] of adult Siblings  Nearest Relative not described above    Per [place]'s Next of Kin hierarchy: Spouse will be [address].

## 2022-04-09 NOTE — ED NOTES
[address] [place] [address] is not in our workers comp binder, pt has been directed to further follow [facility] with [facility].  Other paperwork for occupational health has been completed      Jonas Austin RN  04/09/22 [id number]

## 2022-05-04 ENCOUNTER — HOSPITAL ENCOUNTER (OUTPATIENT)
Dept: PHYSICAL THERAPY | Age: 32
Setting detail: THERAPIES SERIES
Discharge: HOME | End: 2022-05-04
Payer: COMMERCIAL

## 2022-05-04 PROCEDURE — 97161 PT EVAL LOW COMPLEX 20 MIN: CPT

## 2022-05-04 PROCEDURE — 97110 THERAPEUTIC EXERCISES: CPT

## 2022-05-04 ASSESSMENT — PAIN DESCRIPTION - FREQUENCY: FREQUENCY: INTERMITTENT

## 2022-05-04 ASSESSMENT — PAIN - FUNCTIONAL ASSESSMENT: PAIN_FUNCTIONAL_ASSESSMENT: PREVENTS OR INTERFERES SOME ACTIVE ACTIVITIES AND ADLS

## 2022-05-04 ASSESSMENT — PAIN DESCRIPTION - PAIN TYPE: TYPE: ACUTE PAIN

## 2022-05-04 ASSESSMENT — PAIN DESCRIPTION - LOCATION: LOCATION: SHOULDER

## 2022-05-04 ASSESSMENT — PAIN SCALES - GENERAL: PAINLEVEL_OUTOF10: 4

## 2022-05-04 ASSESSMENT — PAIN DESCRIPTION - ONSET: ONSET: AWAKENED FROM SLEEP

## 2022-05-04 ASSESSMENT — PAIN DESCRIPTION - DESCRIPTORS: DESCRIPTORS: ACHING;DULL

## 2022-05-04 NOTE — PROGRESS NOTES
Physical Therapy: Initial Evaluation    Patient: Meli Prabhakar (42 y.o. male)   Examination Date:   Plan of Care Certification Period: 2022 to        :  1990 ;    Confirmed: Yes MRN: [id number]  CSN: [id number]   Insurance: Payor: [facility] [facility] / Product Type: *No Product type* /   Insurance ID: [id number] - (Medicaid Managed) Secondary Insurance (if applicable):    Referring Physician: Belen Read   PCP: Angie Venegas MD Visits to Date/Visits Approved:   /      No Show/Cancelled Appts:   /       Medical Diagnosis: Unspecified sprain of left shoulder joint, initial encounter [S43.402A]  Strain of muscle(s) and tendon(s) of the rotator cuff of left shoulder, initial encounter [S46.012A]  Strain of other muscles, fascia and tendons at shoulder and upper arm level, left arm, initial encounter [B51.908M]  Unspecified sprain of left elbow, initial encounter [S53.402A] shoulder, elbow  Treatment Diagnosis: Decreased LUE strength and ROM     PERTINENT MEDICAL HISTORY   Patient Assessed for Rehabilitation Services: Yes       Medical History: Chart Reviewed: Yes   Past Medical History:   Diagnosis Date    Depression 2016    Essential hypertension 2016    Hypertriglyceridemia 2017    [facility]     Surgical History:   Past Surgical History:   Procedure Laterality Date    CHOLECYSTECTOMY, LAPAROSCOPIC  2018       Medications:   Current Outpatient Medications:     methylPREDNISolone (MEDROL, HARJINDER,) 4 MG tablet, Take by mouth as directed on package, Disp: 21 tablet, Rfl: 0    albuterol sulfate  (90 Base) MCG/ACT inhaler, Inhale 2 puffs into the lungs 4 times daily as needed for Wheezing, Disp: 54 g, Rfl: 5    glimepiride (AMARYL) 1 MG tablet, TAKE 1 TABLET BY MOUTH TWO TIMES A DAY, Disp: 60 tablet, Rfl: 5    sertraline (ZOLOFT) 25 MG tablet, TAKE 1 TABLET BY MOUTH EVERY DAY, Disp: 30 tablet, Rfl: 5    topiramate (TOPAMAX) 50 MG tablet, TAKE 1 TABLET BY MOUTH TWO TIMES DAILY, Disp: 60 tablet, Rfl: 5    LANTUS SOLOSTAR 100 UNIT/ML injection pen, INJECT 60 UNITS INTO THE SKIN NIGHTLY, Disp: 15 mL, Rfl: 5    cloNIDine (CATAPRES) 0.1 MG tablet, TAKE ONE TABLET BY MOUTH TWICE DAILY, Disp: 180 tablet, Rfl: 3    metoprolol succinate (TOPROL XL) 50 MG extended release tablet, Take 1 tablet by mouth daily REFILL ONE TIME UNTIL SEECHRIS MARI, Disp: 90 tablet, Rfl: 3    lisinopril (PRINIVIL;ZESTRIL) 20 MG tablet, Take 1 tablet by mouth daily REFILLED 1 TIME UNTIL SEECHRIS BRADY, Disp: 90 tablet, Rfl: 3    dilTIAZem (CARDIZEM CD) 120 MG extended release capsule, Take 1 capsule by mouth daily, Disp: 90 capsule, Rfl: 3    hydroCHLOROthiazide (HYDRODIURIL) 12.5 MG tablet, Take 1 tablet by mouth every morning, Disp: 30 tablet, Rfl: 0    Insulin Pen Needle 29G X 12MM MISC, 1 each by Does not apply route daily, Disp: 100 each, Rfl: 3    triamcinolone (KENALOG) 0.1 % cream, Apply topically 2 times daily. , Disp: 30 g, Rfl: 1    pantoprazole (PROTONIX) 40 MG tablet, Take 1 tablet by mouth 2 times daily TAKE 1 TABLET BY MOUTH ONE TIME A DAY, Disp: 60 tablet, Rfl: 5    Continuous Blood Gluc  (DEXCOM G6 ) WILLIAN, Check BS 4 times daily, Disp: 1 Device, Rfl: 0    Continuous Blood Gluc Sensor (DEXCOM G6 SENSOR) MISC, Check BS 4-5 times daily, Disp: 1 each, Rfl: 5    insulin lispro, 1 Unit Dial, (HUMALOG KWIKPEN) 100 UNIT/ML SOPN, Inject 5 Units into the skin 3 times daily (before meals), Disp: 5 pen, Rfl: 5    blood glucose monitor kit and supplies, Use 3-4 times daily, Disp: 1 kit, Rfl: 0    blood glucose monitor strips, Test 3-4 times a day & as needed for symptoms of irregular blood glucose., Disp: 100 strip, Rfl: 5    Glucosource Lancets MISC, Use one 3-4 times to check blood sugar, Disp: 100 each, Rfl: 5    ibuprofen (ADVIL;MOTRIN) 800 MG tablet, Take 1 tablet by mouth 2 times daily as needed for Pain (Patient not taking: Reported on 9/7/2021), Disp: 60 tablet, Rfl: 3  Allergies: Tape [adhesive tape]      SUBJECTIVE EXAMINATION       Subjective History:    Subjective: Patient reports the pain in his left shoudler started when he was trimming trees about 1.5-2 weeks. Nancy Mahmood started after he had finished the job. Couldn't shut the door on the truck, turn/hold his arm up. Went to [facility] ED right after and had x-ray compelted without abnormality. Still full time at work and no light duty. He works at a [employer], [employer], [employer], raking cleaning. Will be seeing Dr. Kareem Patton on friday. Reprots tingling in his left hand and down his arm, pain located int he front of the shoulder. dsecribed as dull/ache. No previous shoulder or cervica injuries.   Additional Pertinent Hx (if applicable):     Prior diagnostic testing[de-identified] X-ray  Any changes to allergies, medications, or have you had any medical procedures/ER visits since your last visit?: No      Learning/Language:       Pain Screening   Pain Screening  Patient Currently in Pain: Yes  Pain Assessment: 0-10  Pain Level: 4  Best Pain Level: 7  Worst Pain Level: 1  Pain Type: Acute pain  Pain Location: Shoulder  Pain Descriptors: Aching,Dull  Pain Frequency: Intermittent  Pain Onset: Awakened from sleep  Functional Pain Assessment: Prevents or interferes some active activities and ADLs    Functional Status    Dominant Hand: : Right    Occupation/Interests:  Occupation: Full time employment  Type of Occupation: [employer]  Job Duties: Prolonged standing,Heavy lifting,Repetitive lifting    Prior Level of Function:    Independent     Current Level of Function:  independent      ADL Assistance: Independent  Homemaking Assistance: Independent  Ambulation Assistance: Independent  Transfer Assistance: Independent  Active : Yes  Mode of Transportation: Car    OBJECTIVE EXAMINATION   Restrictions: caution heavy lifting     Review of Systems:  Vision: Within Functional Limits  Hearing: Within functional limits  Follows Commands: Within Functional Limits    Regional Screen:    Cervical Screen: WNL  Elbow/Forearm Screen: WNL    Observations:  Patient ambulates without gait deviations and normal arm swing     Palpation:   Right Shoulder Palpation: WNL  Left Shoulder Palpation: tenderness reported along along proximal biceps insertion; and supraspinatus tendon; noted increased tingling with pressure to biceps muscle belly    Left AROM  Right AROM         AROM LUE (degrees)  L Shoulder Flexion 0-180: 140 deg *painful  L Shoulder ABduction 0-180: 140 deg *painful with popping sensation  L Shoulder Int Rotation  0-70: able to reach T12  L Shoulder Ext Rotation  0-90: able to reach C2-3          Left Strength  Right Strength         Strength LUE  L Shoulder Flexion: 4/5  L Shoulder Extension: 5/5  L Shoulder ABduction: 4-/5  L Shoulder Internal Rotation: 5/5  L Shoulder External Rotation: 4/5  L Elbow Flexion: 5/5  L Elbow Extension: 4+/5    Strength RUE  R Shoulder Flexion: 5/5  R Shoulder Extension: 5/5  R Shoulder ABduction: 5/5  R Shoulder Internal Rotation: 5/5  R Shoulder External Rotation: 5/5  R Shoulder Horizontal ABduction: 5/5  R Shoulder Horizontal ADduction: 5/5  R Elbow Flexion: 5/5  R Elbow Extension: 5/5     Joint Mobility (if applicable):   Joint Integrity Shoulder  General Joint Integrity - Shoulder: Right WNL,Joint mobility assessed (increased pain with inferior glide of GH joint)    Special Tests:   Special Tests: Performed  Impingement Tests  Neer Test: L (+),R (-)  Hawkins Lorenzo Test: L (+),R (-)  Drop Arm: R (-),L (-)  Empty Can: L (+),R (-)  Labral Tests  Crank Test (Labrum): L (-),R (-)     ASSESSMENT     Impression: Assessment: Pt is 29year old male with 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 Pt now has difficulties with opening the car door, lifting/reaching overhead, carrying heavy objects.  Pt demo deficits this date that include reduced shoulder ROM overhead, decreased strength into external rotation and abduction, tenderness along proximal biceps insertion/supraspinatus tendon,, pain with inferior GH mobilization, (+) impingement testing. Testing this date indicate signs and symptoms consistent with a supraspinatus strain/partial tear, unable to rule in/out labral involvement without MRI confirmation. Pt will benefit with PT services with shoulder ROM, joint mobilizations, rotator cuff/scapular strengthening, return to work activities to return to PLOF. Pt prior to onset of current condition had no pain with able to complete full ADLs and work activities. Patient received education on their current pathology and how their condition effects them with their functional activities. Patient understood discussion and questions were answered. Patient understands their activity limitations and understands rational for treatment progression. Body Structures, Functions, Activity Limitations Requiring Skilled Therapeutic Intervention: Decreased functional mobility ,Decreased ADL status,Decreased ROM,Increased pain,Decreased sensation,Decreased strength,Decreased tolerance to work activity,Decreased posture    Statement of Medical Necessity: Physical Therapy is both indicated and medically necessary as outlined in the POC to increase the likelihood of meeting the functionally related goals stated below.      Patient's Activity Tolerance: Patient tolerated evaluation without incident      Patient's rehabilitation potential/prognosis is considered to be: Good    Factors which may impact rehabilitation potential include: None  Measures taken to address barrier(s): N/A     GOALS   Patient Goal(s): improve pain and complete all work duties  Short Term Goals Completed by 5 weeks Goal Status   Pt demo I w/ HEP and symptom management New   Pt demo Quick dash score <30% disability to improve tolerance to ADL's New   Pt demo WNL shoulder flexion and abduction with pain reported <3/10 to improve toelrance to overhead lfiting New   Pt demo 5/5 BUE strength in all directions with pain reported <3/10 New   Patient reports average pain with work duties <5/10 to improve tolerance to activities New     TREATMENT PLAN       Requires PT Follow-Up: Yes    Pt. actively involved in establishing Plan of Care and Goals: Yes  Patient/ Caregiver education and instruction: Angelo Talco of Care,Insurance,Evaluative findings,Pressure Relief,Home Exercise Program,Disease Specific Education,Anatomy of condition         Treatment may include any combination of the following: Strengthening,ROM,Pain management,Endurance training,Manual Therapy - Joint Manipulation,Manual Therapy - Soft Tissue Mobilization,Neuromuscular re-education,Modalities,Therapeutic activities,Return to work related activity,Home exercise program     Frequency / Duration:  Patient to be seen 2x for 5 weeks weeks      Eval Complexity:    Decision Making: Low Complexity     Therapist Signature: Sha Schneider, PT    Date: 0/0/3419     I certify that the above Therapy Services are being furnished while the patient is under my care. I agree with the treatment plan and certify that this therapy is necessary. [de-identified] Signature:  ___________________________   Date:_______                                                                   Dhaval Jones DO        Physician Comments: _______________________________________________    Please sign and return to   [facility]. Please fax to the location listed below.  Arturo Hargrove for this referral!    [address] [facility] [address], [address] [address]  Dept: [phone]  Loc: [phone]

## 2022-05-04 NOTE — PLAN OF CARE
Outpatient Physical Therapy           [place]           [] Phone: [phone]   Fax: [phone]  [place]           [] Phone: [phone]   Fax: [phone]     To: Chon Hernandez   PCP: Daniel Esparza MD      From: Lai Brown, PT, PT     Patient: Jv Mejia       : 1990  Diagnosis: Unspecified sprain of left shoulder joint, initial encounter [S43.402A]  Strain of muscle(s) and tendon(s) of the rotator cuff of left shoulder, initial encounter [S46.012A]  Strain of other muscles, fascia and tendons at shoulder and upper arm level, left arm, initial encounter [E64.882H]  Unspecified sprain of left elbow, initial encounter [S53.402A]    No data recorded  Treatment Diagnosis: Decreased LUE strength and ROM  Date: 2022    Physical Therapy Certification/Re-Certification Form  Dear Dr. Yue Mark,  The following patient has been evaluated for physical therapy services and for therapy to continue, insurance requires physician review of the treatment plan initially and every 90 days. Please review the attached evaluation and/or summary of the patient's plan of care, and verify that you agree therapy should continue by signing the attached document and sending it back to our office. 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 Pt now has difficulties with opening the car door, lifting/reaching overhead, carrying heavy objects. Pt demo deficits this date that include reduced shoulder ROM overhead, decreased strength into external rotation and abduction, tenderness along proximal biceps insertion/supraspinatus tendon,, pain with inferior GH mobilization, (+) impingement testing. Testing this date indicate signs and symptoms consistent with a supraspinatus strain/partial tear, unable to rule in/out labral involvement without MRI confirmation.  Pt will benefit with PT services with shoulder ROM, joint mobilizations, rotator cuff/scapular strengthening, return to work activities to return to PLOF. Pt prior to onset of current condition had no pain with able to complete full ADLs and work activities. Patient received education on their current pathology and how their condition effects them with their functional activities. Patient understood discussion and questions were answered. Patient understands their activity limitations and understands rational for treatment progression. Plan of Care/Treatment to date:  [x] Therapeutic Exercise  [] Modalities:  [x] Therapeutic Activity     [] Ultrasound  [] Electrical Stimulation  [] Gait Training      [] Cervical Traction [] Lumbar Traction  [x] Neuromuscular Re-education    [] Cold/hotpack [] Iontophoresis   [x] Instruction in HEP      [x] Vasopneumatic    [] Dry Needling  [x] Manual Therapy               [] Aquatic Therapy       Other:          Frequency/Duration:  # Days per week: [] 1 day # Weeks: [] 1 week [x] 5 weeks     [x] 2 days   [] 2 weeks [] 6 weeks     [] 3 days   [] 3 weeks [] 7 weeks     [] 4 days   [] 4 weeks [] 8 weeks         [] 9 weeks [] 10 weeks         [] 11 weeks [] 12 weeks    Rehab Potential/Progress: [] Excellent [] Good [] Fair  [] Poor     Goals:    Patient goals: improve pain and complete all work duties  Short term goals  Time Frame for Short term goals: 5 weeks  Pt demo I w/ HEP and symptom management  Pt demo Quick dash score <30% disability to improve tolerance to ADL's  Pt demo WNL shoulder flexion and abduction with pain reported <3/10 to improve toelrance to overhead lfiting  Pt demo 5/5 BUE strength in all directions with pain reported <3/10  Patient reports average pain with work duties <5/10 to improve tolerance to activities      Electronically signed by:  Barbara Schuster PT, DPT, CSCS 5/4/2022, 12:27 PM        If you have any questions or concerns, please don't hesitate to call.   Thank you for your referral.      Physician Signature:________________________________Date:_________ TIME: _____  By signing above, therapists plan is approved by physician

## 2022-05-04 NOTE — FLOWSHEET NOTE
Outpatient Physical Therapy  [place]           [x] Phone: [phone]   Fax: [phone]  [place]           [] Phone: [phone]   Fax: [phone]        Physical Therapy Daily Treatment Note  Date:  2022    Patient Name:  Nacho Lopez    :  1990  MRN: [id number]  Restrictions/Precautions: NONE  Diagnosis:   Unspecified sprain of left shoulder joint, initial encounter [S43.402A]  Strain of muscle(s) and tendon(s) of the rotator cuff of left shoulder, initial encounter [S46.012A]  Strain of other muscles, fascia and tendons at shoulder and upper arm level, left arm, initial encounter [E36.014V]  Unspecified sprain of left elbow, initial encounter [S53.402A]    Date of Injury/Surgery: --  Treatment Diagnosis:  Decreased LUE strength and ROM  Insurance/Certification information: Payor: ED O / Plan: Eva Clinton [employer] / approved 10 visits by 22  Referring Physician:  Yadi Baeza   PCP: Daria Castanon MD  Next Doctor Visit:  --  Plan of care signed (Y/N):  N, sent 22  Outcome Measure: Quick DASH: see folder  Visit# / total visits:   1/10  Pain level: 5/10   Goals:     Patient goals: improve pain and complete all work duties  Short term goals  Time Frame for Short term goals: 5 weeks  Pt demo I w/ HEP and symptom management  Pt demo Quick dash score <30% disability to improve tolerance to ADL's  Pt demo WNL shoulder flexion and abduction with pain reported <3/10 to improve toelrance to overhead lfiting  Pt demo 5/5 BUE strength in all directions with pain reported <3/10  Patient reports average pain with work duties <5/10 to improve tolerance to activities    Summary of Evaluation:   Pt is 29year old male with 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 Pt now has difficulties with opening the car door, lifting/reaching overhead, carrying heavy objects.  Pt demo deficits this date that include reduced shoulder ROM overhead, decreased strength into external rotation and abduction, tenderness along proximal biceps insertion/supraspinatus tendon,, pain with inferior GH mobilization, (+) impingement testing. Testing this date indicate signs and symptoms consistent with a supraspinatus strain/partial tear, unable to rule in/out labral involvement without MRI confirmation. Pt will benefit with PT services with shoulder ROM, joint mobilizations, rotator cuff/scapular strengthening, return to work activities to return to PLOF. Pt prior to onset of current condition had no pain with able to complete full ADLs and work activities. Patient received education on their current pathology and how their condition effects them with their functional activities. Patient understood discussion and questions were answered. Patient understands their activity limitations and understands rational for treatment progression. Subjective:  See tiago         Any changes in Ambulatory Summary Sheet? None        Objective:  See tiago   COVID screening questions were asked and patient attested that there had been no contact or symptoms        Exercises: (No more than 4 columns)   Exercise/Equipment 5/4/22 #1 Date Date           WARM UP                     TABLE      *cane flexion      Supine punch      *bilat TB ER YTB                    STANDING      *Wall Walks                                               PROPRIOCEPTION                                    MODALITIES      vaso                Other Therapeutic Activities/Education:  Patient received education on their current pathology and how their condition effects them with their functional activities. Patient understood discussion and questions were answered. Patient understands their activity limitations and understands rational for treatment progression. Home Exercise Program:  HO issued, reviewed and discussed with patient. Pt agreed to comply.         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 Pt now has difficulties with opening the car door, lifting/reaching overhead, carrying heavy objects. Pt demo deficits this date that include reduced shoulder ROM overhead, decreased strength into external rotation and abduction, tenderness along proximal biceps insertion/supraspinatus tendon,, pain with inferior GH mobilization, (+) impingement testing. Testing this date indicate signs and symptoms consistent with a supraspinatus strain/partial tear, unable to rule in/out labral involvement without MRI confirmation. Pt will benefit with PT services with shoulder ROM, joint mobilizations, rotator cuff/scapular strengthening, return to work activities to return to PLOF. Pt prior to onset of current condition had no pain with able to complete full ADLs and work activities. Patient received education on their current pathology and how their condition effects them with their functional activities. Patient understood discussion and questions were answered. Patient understands their activity limitations and understands rational for treatment progression.        Plan for Next Session: --       Time In / Time Out:  8919-5484        Timed Code/Total Treatment Minutes:  36'   (1) PT maryal   (1) TE       Next Progress Note due:  10th visit      Plan of Care Interventions:  [x] Therapeutic Exercise  [] Modalities:  [x] Therapeutic Activity     [] Ultrasound  [] Estim  [] Gait Training      [] Cervical Traction [] Lumbar Traction  [x] Neuromuscular Re-education    [] Cold/hotpack [] Iontophoresis   [x] Instruction in HEP      [] Vasopneumatic   [] Dry Needling    [x] Manual Therapy               [] Aquatic Therapy              Electronically signed by:  Theresa Mccarty, PT, DPT, CSCS 5/4/2022, 8:41 AM

## 2022-05-05 DIAGNOSIS — K21.9 GASTROESOPHAGEAL REFLUX DISEASE WITHOUT ESOPHAGITIS: ICD-10-CM

## 2022-05-06 RX ORDER — PANTOPRAZOLE SODIUM 40 MG/1
TABLET, DELAYED RELEASE ORAL
Qty: 60 TABLET | Refills: 5 | Status: SHIPPED | OUTPATIENT
Start: 2022-05-06 | End: 2022-10-13 | Stop reason: SDUPTHER

## 2022-05-10 ENCOUNTER — HOSPITAL ENCOUNTER (OUTPATIENT)
Dept: PHYSICAL THERAPY | Age: 32
Setting detail: THERAPIES SERIES
Discharge: HOME | End: 2022-05-10
Payer: COMMERCIAL

## 2022-05-10 PROCEDURE — 97016 VASOPNEUMATIC DEVICE THERAPY: CPT

## 2022-05-10 PROCEDURE — 97110 THERAPEUTIC EXERCISES: CPT

## 2022-05-10 NOTE — FLOWSHEET NOTE
Outpatient Physical Therapy  [facility]           [x] Phone: [phone]   Fax: [phone]  [facility]           [] Phone: [phone]   Fax: [phone]        Physical Therapy Daily Treatment Note  Date:  5/10/2022    Patient Name:  Neeru Montalvo    :  1990  MRN: [id number]  Restrictions/Precautions: NONE  Diagnosis:   Unspecified sprain of left shoulder joint, initial encounter [S43.402A]  Strain of muscle(s) and tendon(s) of the rotator cuff of left shoulder, initial encounter [S46.012A]  Strain of other muscles, fascia and tendons at shoulder and upper arm level, left arm, initial encounter [F42.356T]  Unspecified sprain of left elbow, initial encounter [S53.402A]    Date of Injury/Surgery: --  Treatment Diagnosis:  Decreased LUE strength and ROM  Insurance/Certification information: Payor: ED O / Plan: David Glover [employer] / approved 10 visits by 22  Referring Physician:  Bebeto Jefferson   PCP: Jasmina Hernández MD  Next Doctor Visit:  --  Plan of care signed (Y/N):  N, sent 22  Outcome Measure: Quick DASH: see folder  Visit# / total visits:   2/10  Pain level: 5/10   Goals:     Patient goals: improve pain and complete all work duties  Short term goals  Time Frame for Short term goals: 5 weeks  Pt demo I w/ HEP and symptom management  Pt demo Quick dash score <30% disability to improve tolerance to ADL's  Pt demo WNL shoulder flexion and abduction with pain reported <3/10 to improve toelrance to overhead lfiting  Pt demo 5/5 BUE strength in all directions with pain reported <3/10  Patient reports average pain with work duties <5/10 to improve tolerance to activities    Summary of Evaluation:   Pt is 29year old male with 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 Pt now has difficulties with opening the car door, lifting/reaching overhead, carrying heavy objects.  Pt demo deficits this date that include reduced shoulder ROM overhead, decreased strength into external rotation and abduction, tenderness along proximal biceps insertion/supraspinatus tendon,, pain with inferior GH mobilization, (+) impingement testing. Testing this date indicate signs and symptoms consistent with a supraspinatus strain/partial tear, unable to rule in/out labral involvement without MRI confirmation. Pt will benefit with PT services with shoulder ROM, joint mobilizations, rotator cuff/scapular strengthening, return to work activities to return to PLOF. Pt prior to onset of current condition had no pain with able to complete full ADLs and work activities. Patient received education on their current pathology and how their condition effects them with their functional activities. Patient understood discussion and questions were answered. Patient understands their activity limitations and understands rational for treatment progression. Subjective:  Pt states he may have hurt his shld more over the weekend moving a deck. Did see Dr Cornell Salinas on Friday and is scheduling an MRI. Just waiting for approval.        Any changes in Ambulatory Summary Sheet? None        Objective:  See eval   COVID screening questions were asked and patient attested that there had been no contact or symptoms    Popping noted with wall walks      Exercises: (No more than 4 columns)   Exercise/Equipment 5/4/22 #1 Date  5/10/22 #2 Date           WARM UP                     TABLE      *cane flexion  10x    Supine cane press up  10x    Supine punch  10x    *bilat TB ER YTB YTB 10x2    Rhythmic stabs  At 90 deg flex 20 sec x 3             STANDING      *Wall Walks  10x                                             PROPRIOCEPTION                                    MODALITIES      vaso  10'              Other Therapeutic Activities/Education:  Patient received education on their current pathology and how their condition effects them with their functional activities.  Patient understood discussion and questions were answered. Patient understands their activity limitations and understands rational for treatment progression. Home Exercise Program:  HO issued, reviewed and discussed with patient. Pt agreed to comply. Manual Treatments:  PROM ea dir      Modalities:  --      Communication with other providers:  tiago sent 5/4/22      Assessment:  (Response towards treatment session) (Pain Rating)  Pt demonstrated overall fair tolerance to today's session. Did notice some popping with wall slides and also reported of intermittent tingling in his arm with exercises. Did see physician and is waiting for approval for MRI. 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 Pt now has difficulties with opening the car door, lifting/reaching overhead, carrying heavy objects. Pt demo deficits this date that include reduced shoulder ROM overhead, decreased strength into external rotation and abduction, tenderness along proximal biceps insertion/supraspinatus tendon,, pain with inferior GH mobilization, (+) impingement testing. Testing this date indicate signs and symptoms consistent with a supraspinatus strain/partial tear, unable to rule in/out labral involvement without MRI confirmation. Pt will benefit with PT services with shoulder ROM, joint mobilizations, rotator cuff/scapular strengthening, return to work activities to return to PLOF. Pt prior to onset of current condition had no pain with able to complete full ADLs and work activities. Patient received education on their current pathology and how their condition effects them with their functional activities. Patient understood discussion and questions were answered. Patient understands their activity limitations and understands rational for treatment progression.        Plan for Next Session: --       Time In / Time Out:  8676-1340        Timed Code/Total Treatment Minutes: 30/ 40'  (2) TE, (1) vaso       Next Progress Note due:  10th visit      Plan of Care Interventions:  [x] Therapeutic Exercise  [] Modalities:  [x] Therapeutic Activity     [] Ultrasound  [] Estim  [] Gait Training      [] Cervical Traction [] Lumbar Traction  [x] Neuromuscular Re-education    [] Cold/hotpack [] Iontophoresis   [x] Instruction in HEP      [] Vasopneumatic   [] Dry Needling    [x] Manual Therapy               [] Aquatic Therapy              Electronically signed by:  Golden Sheffield 5/10/2022, 12:21 PM

## 2022-05-12 ENCOUNTER — HOSPITAL ENCOUNTER (OUTPATIENT)
Dept: PHYSICAL THERAPY | Age: 32
Setting detail: THERAPIES SERIES
Discharge: HOME | End: 2022-05-12
Payer: COMMERCIAL

## 2022-05-12 PROCEDURE — 97016 VASOPNEUMATIC DEVICE THERAPY: CPT

## 2022-05-12 PROCEDURE — 97110 THERAPEUTIC EXERCISES: CPT

## 2022-05-12 NOTE — FLOWSHEET NOTE
Outpatient Physical Therapy  [facility]           [x] Phone: [phone]   Fax: [phone]  [facility]           [] Phone: [phone]   Fax: [phone]        Physical Therapy Daily Treatment Note  Date:  2022    Patient Name:  Brett Murguia    :  1990  MRN: [id number]  Restrictions/Precautions: NONE  Diagnosis:   Unspecified sprain of left shoulder joint, initial encounter [S43.402A]  Strain of muscle(s) and tendon(s) of the rotator cuff of left shoulder, initial encounter [S46.012A]  Strain of other muscles, fascia and tendons at shoulder and upper arm level, left arm, initial encounter [R57.418I]  Unspecified sprain of left elbow, initial encounter [S53.402A]    Date of Injury/Surgery: --  Treatment Diagnosis:  Decreased LUE strength and ROM  Insurance/Certification information: Payor: ED O / Plan: Casandra TAYLOR / approved 10 visits by 22  Referring Physician:  Alex Del Castillo   PCP: Winnie Olmedo MD  Next Doctor Visit:  --  Plan of care signed (Y/N):  N, sent 22  Outcome Measure: Quick DASH: see folder  Visit# / total visits:   3/10  Pain level: 2-310   Goals:     Patient goals: improve pain and complete all work duties  Short term goals  Time Frame for Short term goals: 5 weeks  Pt demo I w/ HEP and symptom management  Pt demo Quick dash score <30% disability to improve tolerance to ADL's  Pt demo WNL shoulder flexion and abduction with pain reported <3/10 to improve toelrance to overhead lfiting  Pt demo 5/5 BUE strength in all directions with pain reported <3/10  Patient reports average pain with work duties <5/10 to improve tolerance to activities    Summary of Evaluation:   Pt is 29year old male with 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 Pt now has difficulties with opening the car door, lifting/reaching overhead, carrying heavy objects.  Pt demo deficits this date that include reduced shoulder ROM overhead, decreased strength into external rotation and abduction, tenderness along proximal biceps insertion/supraspinatus tendon,, pain with inferior GH mobilization, (+) impingement testing. Testing this date indicate signs and symptoms consistent with a supraspinatus strain/partial tear, unable to rule in/out labral involvement without MRI confirmation. Pt will benefit with PT services with shoulder ROM, joint mobilizations, rotator cuff/scapular strengthening, return to work activities to return to PLOF. Pt prior to onset of current condition had no pain with able to complete full ADLs and work activities. Patient received education on their current pathology and how their condition effects them with their functional activities. Patient understood discussion and questions were answered. Patient understands their activity limitations and understands rational for treatment progression. Subjective:  Pt states his whole arm feels numb and tingly this morning. States his pain was 6/10 after moving sand yesterday. Did notice some increase sore after previous session having to take Tylenol. Still waiting to hear about authorization for MRI. Any changes in Ambulatory Summary Sheet? None        Objective:  See eval   COVID screening questions were asked and patient attested that there had been no contact or symptoms    Continues to have \"popping\" with overhead movements.   PROM flex WNL    Exercises: (No more than 4 columns)   Exercise/Equipment 5/4/22 #1 Date  5/10/22 #2 Date 5/12/22 #3           WARM UP                     TABLE      *cane flexion  10x 10x   Supine cane press up  10x 10x2   Supine punch  10x 10x2   Supine circles   10x2 cw/ccw   *bilat TB ER YTB YTB 10x2 YTB 10x2   Rhythmic stabs  At 90 deg flex 20 sec x 3 At 90 deg flex 20 sec x 3    Rhythmic stabs    IR/ER arm close to side on towel roll 20 sec x3   STANDING      *Wall Walks  10x corewheel - 10x gradually increasing range PROPRIOCEPTION                                    MODALITIES      vaso  10' 10' with CP also across top of shld under cuff             Other Therapeutic Activities/Education:  Patient received education on their current pathology and how their condition effects them with their functional activities. Patient understood discussion and questions were answered. Patient understands their activity limitations and understands rational for treatment progression. Home Exercise Program:  HO issued, reviewed and discussed with patient. Pt agreed to comply. Manual Treatments:        Modalities:  --      Communication with other providers:  eval sent 5/4/22      Assessment:  (Response towards treatment session) (Pain Rating)  Pt demonstrated overall fair tolerance to today's session. Continues to have \"popping\" with overhead movements. Came in today with N/T in his arm and remained throughout session. Still waiting for authorization for MRI. Continue with therapy as tolerated. 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 Pt now has difficulties with opening the car door, lifting/reaching overhead, carrying heavy objects. Pt demo deficits this date that include reduced shoulder ROM overhead, decreased strength into external rotation and abduction, tenderness along proximal biceps insertion/supraspinatus tendon,, pain with inferior GH mobilization, (+) impingement testing. Testing this date indicate signs and symptoms consistent with a supraspinatus strain/partial tear, unable to rule in/out labral involvement without MRI confirmation. Pt will benefit with PT services with shoulder ROM, joint mobilizations, rotator cuff/scapular strengthening, return to work activities to return to PLOF. Pt prior to onset of current condition had no pain with able to complete full ADLs and work activities.  Patient received education on their current pathology and how their condition effects them with their functional activities. Patient understood discussion and questions were answered. Patient understands their activity limitations and understands rational for treatment progression.        Plan for Next Session: --       Time In / Time Out:  0805- 0851       Timed Code/Total Treatment Minutes:  36/ 55'  (2) TE, (1) vaso       Next Progress Note due:  10th visit      Plan of Care Interventions:  [x] Therapeutic Exercise  [] Modalities:  [x] Therapeutic Activity     [] Ultrasound  [] Estim  [] Gait Training      [] Cervical Traction [] Lumbar Traction  [x] Neuromuscular Re-education    [] Cold/hotpack [] Iontophoresis   [x] Instruction in HEP      [] Vasopneumatic   [] Dry Needling    [x] Manual Therapy               [] Aquatic Therapy              Electronically signed by:  Yves Olsen 5/12/2022, 8:05 AM

## 2022-05-17 ENCOUNTER — HOSPITAL ENCOUNTER (OUTPATIENT)
Dept: PHYSICAL THERAPY | Age: 32
Setting detail: THERAPIES SERIES
Discharge: HOME | End: 2022-05-17
Payer: COMMERCIAL

## 2022-05-17 PROCEDURE — 97016 VASOPNEUMATIC DEVICE THERAPY: CPT

## 2022-05-17 PROCEDURE — 97110 THERAPEUTIC EXERCISES: CPT

## 2022-05-17 NOTE — FLOWSHEET NOTE
Outpatient Physical Therapy  [facility]           [x] Phone: [phone]   Fax: [phone]  [facility]           [] Phone: [phone]   Fax: [phone]        Physical Therapy Daily Treatment Note  Date:  2022    Patient Name:  Reshma Gleason    :  1990  MRN: [id number]  Restrictions/Precautions: NONE  Diagnosis:   Unspecified sprain of left shoulder joint, initial encounter [S43.402A]  Strain of muscle(s) and tendon(s) of the rotator cuff of left shoulder, initial encounter [S46.012A]  Strain of other muscles, fascia and tendons at shoulder and upper arm level, left arm, initial encounter [X44.095Z]  Unspecified sprain of left elbow, initial encounter [S53.402A]    Date of Injury/Surgery: --  Treatment Diagnosis:  Decreased LUE strength and ROM  Insurance/Certification information: Payor: ED [employer]CO / Plan: Gorge Cisneros [employer] / approved 10 visits by 22  Referring Physician:  Lenore Streeter   PCP: Reena López MD  Next Doctor Visit:  Dr. Martha Akers 22   Plan of care signed (Y/N):  Y, sent 22  Outcome Measure: Quick DASH: see folder  Visit# / total visits:   4/10  Pain level:  0/10   Goals:     Patient goals: improve pain and complete all work duties  Short term goals  Time Frame for Short term goals: 5 weeks  Pt demo I w/ HEP and symptom management reports compliance  Pt demo Quick dash score <30% disability to improve tolerance to ADL's  Pt demo WNL shoulder flexion and abduction with pain reported <3/10 to improve toelrance to overhead lfiting  Pt demo 5/5 BUE strength in all directions with pain reported <3/10  Patient reports average pain with work duties <5/10 to improve tolerance to activities    Summary of Evaluation:   Pt is 29year old male with 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 Pt now has difficulties with opening the car door, lifting/reaching overhead, carrying heavy objects.  Pt demo deficits this date that include reduced shoulder ROM overhead, decreased strength into external rotation and abduction, tenderness along proximal biceps insertion/supraspinatus tendon,, pain with inferior GH mobilization, (+) impingement testing. Testing this date indicate signs and symptoms consistent with a supraspinatus strain/partial tear, unable to rule in/out labral involvement without MRI confirmation. Pt will benefit with PT services with shoulder ROM, joint mobilizations, rotator cuff/scapular strengthening, return to work activities to return to PLOF. Pt prior to onset of current condition had no pain with able to complete full ADLs and work activities. Patient received education on their current pathology and how their condition effects them with their functional activities. Patient understood discussion and questions were answered. Patient understands their activity limitations and understands rational for treatment progression. Subjective:  Pt states he feels better today, 0/10 pain with minimal tingling through the entire L arm. MRI scheduled for today at 1:45pm.      Any changes in Ambulatory Summary Sheet?   None      Objective:  See eval   COVID screening questions were asked and patient attested that there had been no contact or symptoms    Slight increased tingling to elbow with supine chest press    Exercises: (No more than 4 columns)   Exercise/Equipment 5/4/22 #1 Date  5/10/22 #2 Date 5/12/22 #3 5/17/22 #4            WARM UP                        TABLE       *cane flexion  10x 10x x10   Supine cane press up  10x 10x2 2x10   Supine punch  10x 10x2 2x10 3-4\" count   Supine circles   10x2 cw/ccw 2x10 CW/CCW   *bilat TB ER YTB YTB 10x2 YTB 10x2 2x10 YTB   Rhythmic stabs  At 90 deg flex 20 sec x 3 At 90 deg flex 20 sec x 3 At 90 deg flex 20 sec x 3    Rhythmic stabs    IR/ER arm close to side on towel roll 20 sec x3 --   SL Flex/ABD    x10 ea, within pain tolerance    STANDING       *Wall Walks  10x corewheel - 10x gradually increasing range 2x10 within pain tolerance    TB Rows    2x10 blue TB   Biceps Curl    2x10 5#   Bent Over Row    next   [place] On Wall    next                      PROPRIOCEPTION                                          MODALITIES       vaso  10' 10' with CP also across top of shld under cuff 10' with CP also across top of shld under cuff              Other Therapeutic Activities/Education:  Patient received education on their current pathology and how their condition effects them with their functional activities. Patient understood discussion and questions were answered. Patient understands their activity limitations and understands rational for treatment progression. Home Exercise Program:  HO issued, reviewed and discussed with patient. Pt agreed to comply. Manual Treatments:  --      Modalities:  [place] to L shoulder in sitting, low pressure, 34 deg x10' for pain management. No adverse effects. Communication with other providers:  eval sent 5/4/22      Assessment:  Minnie demonstrates fair tolerance to today's session. He continues to have constant numbness/tingling in his LUE, that changes very little with activity. Will be undergoing an MRI today. 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 Pt now has difficulties with opening the car door, lifting/reaching overhead, carrying heavy objects. Pt demo deficits this date that include reduced shoulder ROM overhead, decreased strength into external rotation and abduction, tenderness along proximal biceps insertion/supraspinatus tendon,, pain with inferior GH mobilization, (+) impingement testing. Testing this date indicate signs and symptoms consistent with a supraspinatus strain/partial tear, unable to rule in/out labral involvement without MRI confirmation.  Pt will benefit with PT services with shoulder ROM, joint mobilizations, rotator cuff/scapular strengthening, return to work activities to return to PLOF. Pt prior to onset of current condition had no pain with able to complete full ADLs and work activities. Patient received education on their current pathology and how their condition effects them with their functional activities. Patient understood discussion and questions were answered. Patient understands their activity limitations and understands rational for treatment progression.        Plan for Next Session: --       Time In / Time Out:  0087-4051      Timed Code/Total Treatment Minutes:  35'/ 39'  (2) TE, (1) vaso       Next Progress Note due:  10th visit      Plan of Care Interventions:  [x] Therapeutic Exercise  [] Modalities:  [x] Therapeutic Activity     [] Ultrasound  [] Estim  [] Gait Training      [] Cervical Traction [] Lumbar Traction  [x] Neuromuscular Re-education    [] Cold/hotpack [] Iontophoresis   [x] Instruction in HEP      [] Vasopneumatic   [] Dry Needling    [x] Manual Therapy               [] Aquatic Therapy              Electronically signed by:  Kwan Bauer PT, DPT, CSCS 5/17/2022, 6:29 AM

## 2022-05-19 ENCOUNTER — HOSPITAL ENCOUNTER (OUTPATIENT)
Dept: PHYSICAL THERAPY | Age: 32
Setting detail: THERAPIES SERIES
Discharge: HOME | End: 2022-05-19
Payer: COMMERCIAL

## 2022-05-19 PROCEDURE — 97110 THERAPEUTIC EXERCISES: CPT

## 2022-05-19 PROCEDURE — 97016 VASOPNEUMATIC DEVICE THERAPY: CPT

## 2022-05-19 NOTE — FLOWSHEET NOTE
Outpatient Physical Therapy  [place]           [x] Phone: [phone]   Fax: [phone]  [facility]           [] Phone: [phone]   Fax: [phone]        Physical Therapy Daily Treatment Note  Date:  2022    Patient Name:  Chiquita Wilson    :  1990  MRN: [id number]  Restrictions/Precautions: NONE  Diagnosis:   Unspecified sprain of left shoulder joint, initial encounter [S43.402A]  Strain of muscle(s) and tendon(s) of the rotator cuff of left shoulder, initial encounter [S46.012A]  Strain of other muscles, fascia and tendons at shoulder and upper arm level, left arm, initial encounter [O13.877J]  Unspecified sprain of left elbow, initial encounter [S53.402A]    Date of Injury/Surgery: --  Treatment Diagnosis:  Decreased LUE strength and ROM  Insurance/Certification information: Payor: ED O / Plan: Luis M Jacobson O / approved 10 visits by 22  Referring Physician:  Nahomy Pate   PCP: Ruben Fair MD  Next Doctor Visit:  Dr. Mariam Monson 22   Plan of care signed (Y/N):  Y, sent 22  Outcome Measure: Quick DASH: see folder  Visit# / total visits:   5/10  Pain level:  2-3/10, reports of N/T top of shld and down arm   Goals:     Patient goals: improve pain and complete all work duties  Short term goals  Time Frame for Short term goals: 5 weeks  Pt demo I w/ HEP and symptom management reports compliance  Pt demo Quick dash score <30% disability to improve tolerance to ADL's  Pt demo WNL shoulder flexion and abduction with pain reported <3/10 to improve toelrance to overhead lfiting  Pt demo 5/5 BUE strength in all directions with pain reported <3/10  Patient reports average pain with work duties <5/10 to improve tolerance to activities    Summary of Evaluation:   Pt is 29year old male with 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  Pt now has difficulties with opening the car door, lifting/reaching overhead, carrying heavy objects. Pt demo deficits this date that include reduced shoulder ROM overhead, decreased strength into external rotation and abduction, tenderness along proximal biceps insertion/supraspinatus tendon,, pain with inferior GH mobilization, (+) impingement testing. Testing this date indicate signs and symptoms consistent with a supraspinatus strain/partial tear, unable to rule in/out labral involvement without MRI confirmation. Pt will benefit with PT services with shoulder ROM, joint mobilizations, rotator cuff/scapular strengthening, return to work activities to return to PLOF. Pt prior to onset of current condition had no pain with able to complete full ADLs and work activities. Patient received education on their current pathology and how their condition effects them with their functional activities. Patient understood discussion and questions were answered. Patient understands their activity limitations and understands rational for treatment progression. Subjective:  Pt states his shld MRI came back ok. Did show arthritis but nothing was torn. Is going to be scheduled for EMG and then possibly see spine specialist.      Any changes in Ambulatory Summary Sheet? None      Objective:  See eval   COVID screening questions were asked and patient attested that there had been no contact or symptoms    Did have some N/T with exercises today.   TTP left UT  Exercises: (No more than 4 columns)   Exercise/Equipment Date 5/12/22 #3 5/17/22 #4 5/19/22 #5           WARM UP                     TABLE      *cane flexion 10x x10  x 10   Supine cane press up 10x2 2x10 2x10   Supine punch 10x2 2x10 3-4\" count 2x10 3-4\" count   Supine circles 10x2 cw/ccw 2x10 CW/CCW 2x10 CW/CCW   *bilat TB ER YTB 10x2 2x10 YTB 2x10 YTB   Rhythmic stabs At 90 deg flex 20 sec x 3 At 90 deg flex 20 sec x 3 At 90 deg flex 20 sec x 3    Rhythmic stabs  IR/ER arm close to side on towel roll 20 sec x3 -- --   SL Flex/ABD  x10 ea, within pain tolerance  x10 ea, within pain tolerance   STANDING      *Wall Walks corewheel - 10x gradually increasing range 2x10 within pain tolerance  2x10 within pain tolerance   TB Rows  2x10 blue TB 2x10 blue TB   Biceps Curl  2x10 5# 2x10 5#   Bent Over Row  next 2x10 5#   Ball On Rebecca Schlatter  next 10x 4 dir                    PROPRIOCEPTION                                    MODALITIES      vaso 10' with CP also across top of shld under cuff 10' with CP also across top of shld under cuff 10' with CP also across top of shld under cuff             Other Therapeutic Activities/Education:  Patient received education on their current pathology and how their condition effects them with their functional activities. Patient understood discussion and questions were answered. Patient understands their activity limitations and understands rational for treatment progression. Home Exercise Program:  HO issued, reviewed and discussed with patient. Pt agreed to comply. Manual Treatments:  --      Modalities:  Gameready to L shoulder in sitting, low pressure, 34 deg x10' for pain management. No adverse effects. Communication with other providers:  eval sent 5/4/22      Assessment:  Ariel Rodriguez demonstrates fair tolerance to today's session. He continues to have constant numbness/tingling in his LUE, that changes very little with activity. Is currently waiting for authorization for EMG. Continue x 1 session on Monday  then will need date extension for remaining 4 visits. 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 Pt now has difficulties with opening the car door, lifting/reaching overhead, carrying heavy objects.  Pt demo deficits this date that include reduced shoulder ROM overhead, decreased strength into external rotation and abduction, tenderness along proximal biceps insertion/supraspinatus tendon,, pain with inferior GH mobilization, (+) impingement testing. Testing this date indicate signs and symptoms consistent with a supraspinatus strain/partial tear, unable to rule in/out labral involvement without MRI confirmation. Pt will benefit with PT services with shoulder ROM, joint mobilizations, rotator cuff/scapular strengthening, return to work activities to return to PLOF. Pt prior to onset of current condition had no pain with able to complete full ADLs and work activities. Patient received education on their current pathology and how their condition effects them with their functional activities. Patient understood discussion and questions were answered. Patient understands their activity limitations and understands rational for treatment progression.        Plan for Next Session: --       Time In / Time Out:  3689-1803      Timed Code/Total Treatment Minutes:  35'/ 39'  (2) TE, (1) vaso       Next Progress Note due:  10th visit      Plan of Care Interventions:  [x] Therapeutic Exercise  [] Modalities:  [x] Therapeutic Activity     [] Ultrasound  [] Estim  [] Gait Training      [] Cervical Traction [] Lumbar Traction  [x] Neuromuscular Re-education    [] Cold/hotpack [] Iontophoresis   [x] Instruction in HEP      [] Vasopneumatic   [] Dry Needling    [x] Manual Therapy               [] Aquatic Therapy              Electronically signed by:  Denver Crosser, PTA 5/19/2022, 1:47 PM

## 2022-07-12 NOTE — DISCHARGE SUMMARY
Outpatient Physical Therapy           [place]           [] Phone: [phone]   Fax: [phone]  [place]           [] Phone: [phone]   Fax: [phone]     To: Reece Lee DO     From: Chaitanya Snyder PT, PT     Patient: Nydia Wooten       : 1990  Diagnosis: Unspecified sprain of left shoulder joint, initial encounter [S43.402A]  Strain of muscle(s) and tendon(s) of the rotator cuff of left shoulder, initial encounter [S46.012A]  Strain of other muscles, fascia and tendons at shoulder and upper arm level, left arm, initial encounter [G34.353J]  Unspecified sprain of left elbow, initial encounter [S53.402A]    Treatment Diagnosis:    Date: 2022       Physical Therapy Discharge Form  Dear Dr. Lucy Koenig,     The following patient has not returned to physical therapy in > 30 days since the start of therapy and will be formally discharged at this time. If you have any questions or concerns please contact our office. Electronically signed by:  Chaitanya Snyder, PT, DPT, [facility] 2022, 2:04 PM        If you have any questions or concerns, please don't hesitate to call.   Thank you for your referral.      Physician Signature:________________________________Date:_________ TIME: _____  By signing above, therapists plan is approved by physician

## 2022-07-18 ENCOUNTER — HOSPITAL ENCOUNTER (OUTPATIENT)
Dept: MRI IMAGING | Age: 32
Discharge: HOME | End: 2022-07-18
Payer: COMMERCIAL

## 2022-07-18 DIAGNOSIS — M54.16 LUMBAR RADICULOPATHY: ICD-10-CM

## 2022-07-18 PROCEDURE — 72148 MRI LUMBAR SPINE W/O DYE: CPT

## 2022-08-18 ENCOUNTER — OFFICE VISIT (OUTPATIENT)
Dept: FAMILY MEDICINE CLINIC | Age: 32
End: 2022-08-18
Payer: COMMERCIAL

## 2022-08-18 VITALS
BODY MASS INDEX: 38.8 KG/M2 | WEIGHT: 302.2 LBS | OXYGEN SATURATION: 97 % | SYSTOLIC BLOOD PRESSURE: 120 MMHG | DIASTOLIC BLOOD PRESSURE: 80 MMHG | HEART RATE: 79 BPM

## 2022-08-18 DIAGNOSIS — K21.9 GASTROESOPHAGEAL REFLUX DISEASE WITHOUT ESOPHAGITIS: ICD-10-CM

## 2022-08-18 DIAGNOSIS — E11.65 UNCONTROLLED TYPE 2 DIABETES MELLITUS WITH HYPERGLYCEMIA (HCC): Primary | ICD-10-CM

## 2022-08-18 DIAGNOSIS — I10 ESSENTIAL HYPERTENSION: ICD-10-CM

## 2022-08-18 DIAGNOSIS — G89.29 CHRONIC MIDLINE LOW BACK PAIN WITH SCIATICA, SCIATICA LATERALITY UNSPECIFIED: ICD-10-CM

## 2022-08-18 DIAGNOSIS — F32.A ANXIETY AND DEPRESSION: ICD-10-CM

## 2022-08-18 DIAGNOSIS — M54.40 CHRONIC MIDLINE LOW BACK PAIN WITH SCIATICA, SCIATICA LATERALITY UNSPECIFIED: ICD-10-CM

## 2022-08-18 DIAGNOSIS — F41.9 ANXIETY AND DEPRESSION: ICD-10-CM

## 2022-08-18 LAB — HBA1C MFR BLD: 9.9 %

## 2022-08-18 PROCEDURE — 4004F PT TOBACCO SCREEN RCVD TLK: CPT | Performed by: FAMILY MEDICINE

## 2022-08-18 PROCEDURE — 2022F DILAT RTA XM EVC RTNOPTHY: CPT | Performed by: FAMILY MEDICINE

## 2022-08-18 PROCEDURE — 3046F HEMOGLOBIN A1C LEVEL >9.0%: CPT | Performed by: FAMILY MEDICINE

## 2022-08-18 PROCEDURE — 99214 OFFICE O/P EST MOD 30 MIN: CPT | Performed by: FAMILY MEDICINE

## 2022-08-18 PROCEDURE — 83036 HEMOGLOBIN GLYCOSYLATED A1C: CPT | Performed by: FAMILY MEDICINE

## 2022-08-18 PROCEDURE — G8427 DOCREV CUR MEDS BY ELIG CLIN: HCPCS | Performed by: FAMILY MEDICINE

## 2022-08-18 PROCEDURE — G8417 CALC BMI ABV UP PARAM F/U: HCPCS | Performed by: FAMILY MEDICINE

## 2022-08-18 RX ORDER — DULOXETIN HYDROCHLORIDE 30 MG/1
30 CAPSULE, DELAYED RELEASE ORAL DAILY
Qty: 30 CAPSULE | Refills: 3 | Status: SHIPPED | OUTPATIENT
Start: 2022-08-18 | End: 2022-10-13 | Stop reason: SDUPTHER

## 2022-08-18 RX ORDER — DULAGLUTIDE 0.75 MG/.5ML
0.75 INJECTION, SOLUTION SUBCUTANEOUS WEEKLY
Qty: 4 PEN | Refills: 0 | Status: SHIPPED | OUTPATIENT
Start: 2022-08-18 | End: 2022-10-13

## 2022-08-18 RX ORDER — HYDROCODONE BITARTRATE AND ACETAMINOPHEN 5; 325 MG/1; MG/1
1 TABLET ORAL EVERY 6 HOURS PRN
Qty: 28 TABLET | Refills: 0 | Status: SHIPPED | OUTPATIENT
Start: 2022-08-18 | End: 2022-08-25

## 2022-08-18 ASSESSMENT — PATIENT HEALTH QUESTIONNAIRE - PHQ9
6. FEELING BAD ABOUT YOURSELF - OR THAT YOU ARE A FAILURE OR HAVE LET YOURSELF OR YOUR FAMILY DOWN: 0
5. POOR APPETITE OR OVEREATING: 0
SUM OF ALL RESPONSES TO PHQ QUESTIONS 1-9: 5
SUM OF ALL RESPONSES TO PHQ9 QUESTIONS 1 & 2: 0
SUM OF ALL RESPONSES TO PHQ QUESTIONS 1-9: 5
SUM OF ALL RESPONSES TO PHQ QUESTIONS 1-9: 5
2. FEELING DOWN, DEPRESSED OR HOPELESS: 0
7. TROUBLE CONCENTRATING ON THINGS, SUCH AS READING THE NEWSPAPER OR WATCHING TELEVISION: 3
10. IF YOU CHECKED OFF ANY PROBLEMS, HOW DIFFICULT HAVE THESE PROBLEMS MADE IT FOR YOU TO DO YOUR WORK, TAKE CARE OF THINGS AT HOME, OR GET ALONG WITH OTHER PEOPLE: 0
8. MOVING OR SPEAKING SO SLOWLY THAT OTHER PEOPLE COULD HAVE NOTICED. OR THE OPPOSITE, BEING SO FIGETY OR RESTLESS THAT YOU HAVE BEEN MOVING AROUND A LOT MORE THAN USUAL: 0
4. FEELING TIRED OR HAVING LITTLE ENERGY: 1
3. TROUBLE FALLING OR STAYING ASLEEP: 1
9. THOUGHTS THAT YOU WOULD BE BETTER OFF DEAD, OR OF HURTING YOURSELF: 0
1. LITTLE INTEREST OR PLEASURE IN DOING THINGS: 0
SUM OF ALL RESPONSES TO PHQ QUESTIONS 1-9: 5

## 2022-08-18 NOTE — PROGRESS NOTES
Lo Weems  1990 08/19/22    Chief Complaint   Patient presents with    Diabetes     Patient here for a diabetes F/U    Hypertension    Gastroesophageal Reflux    Anxiety    Depression    Lower Back Pain           And here for a follow-up visit regarding his diabetes type 2, HTN, GERD, anxiety and depression. She does take Lantus 80 units at night, he does take glimepiride 1 mg twice a day. The blood sugar at home its high reading 200-300 most of the time. Does not take the short acting insulin. The patient is taking hypertensive medications compliantly without side effects. Denies chest pain, dyspnea, edema, or TIA's.  his anxiety and depression not under control, he is taking the zoloft 25 mg.   And also complaining of her chronic back pain and he does see Dr. Danya Manzanares, decided to do surgery but they cannot because of the blood sugar uncontrolled      Past Medical History:   Diagnosis Date    Depression 5/31/2016    Essential hypertension 5/31/2016    Hypertriglyceridemia 4/25/2017    New onset type 2 diabetes mellitus ([facility] [address] 2/6/2021     Past Surgical History:   Procedure Laterality Date    CHOLECYSTECTOMY, LAPAROSCOPIC  06/12/2018     Family History   Problem Relation Age of Onset    Diabetes Maternal Uncle     Stroke Maternal Uncle     Cancer Maternal Grandmother     Heart Disease Maternal Grandmother     Stroke Maternal Grandmother     Stroke Maternal Grandfather      Social History     Socioeconomic History    Marital status:      Spouse name: Not on file    Number of children: Not on file    Years of education: Not on file    Highest education level: Not on file   Occupational History    Not on file   Tobacco Use    Smoking status: Every Day     Packs/day: 0.25     Years: 9.00     Pack years: 2.25     Types: E-Cigarettes, Cigarettes    Smokeless tobacco: Never    Tobacco comments:     smokes vape   Vaping Use    Vaping Use: Every day    Substances: Always   Substance and Sexual Activity    Alcohol use: Yes     Comment: socially    Drug use: No    Sexual activity: Yes     Partners: Female   Other Topics Concern    Not on file   Social History Narrative    Not on file     Social Determinants of Health     Financial Resource Strain: Not on file   Food Insecurity: Not on file   Transportation Needs: Not on file   Physical Activity: Not on file   Stress: Not on file   Social Connections: Not on file   Intimate Partner Violence: Not on file   Housing Stability: Not on file       Allergies   Allergen Reactions    Tape Ariadna Forbes Tape] Rash     And tegaderms     Current Outpatient Medications   Medication Sig Dispense Refill    Dulaglutide (TRULICITY) 5.20 VR/9.5YX SOPN Inject 0.75 mg into the skin once a week 4 pen 0    sertraline (ZOLOFT) 50 MG tablet TAKE 1 TABLET BY MOUTH EVERY DAY 30 tablet 5    DULoxetine (CYMBALTA) 30 MG extended release capsule Take 1 capsule by mouth daily 30 capsule 3    HYDROcodone-acetaminophen (NORCO) 5-325 MG per tablet Take 1 tablet by mouth every 6 hours as needed for Pain for up to 7 days. Intended supply: 7 days.  Take lowest dose possible to manage pain 28 tablet 0    pantoprazole (PROTONIX) 40 MG tablet TAKE 1 TABLET BY MOUTH TWO TIMES A DAY 60 tablet 5    methylPREDNISolone (MEDROL, HARJINDER,) 4 MG tablet Take by mouth as directed on package 21 tablet 0    albuterol sulfate  (90 Base) MCG/ACT inhaler Inhale 2 puffs into the lungs 4 times daily as needed for Wheezing 54 g 5    glimepiride (AMARYL) 1 MG tablet TAKE 1 TABLET BY MOUTH TWO TIMES A DAY 60 tablet 5    topiramate (TOPAMAX) 50 MG tablet TAKE 1 TABLET BY MOUTH TWO TIMES DAILY 60 tablet 5    LANTUS SOLOSTAR 100 UNIT/ML injection pen INJECT 60 UNITS INTO THE SKIN NIGHTLY 15 mL 5    cloNIDine (CATAPRES) 0.1 MG tablet TAKE ONE TABLET BY MOUTH TWICE DAILY 180 tablet 3    metoprolol succinate (TOPROL XL) 50 MG extended release tablet Take 1 tablet by mouth daily REFILL ONE TIME UNTIL SEES DR 90 tablet 3 lisinopril (PRINIVIL;ZESTRIL) 20 MG tablet Take 1 tablet by mouth daily REFILLED 1 TIME UNTIL SEES DRMal 90 tablet 3    dilTIAZem (CARDIZEM CD) 120 MG extended release capsule Take 1 capsule by mouth daily 90 capsule 3    Insulin Pen Needle 29G X 12MM MISC 1 each by Does not apply route daily 100 each 3    triamcinolone (KENALOG) 0.1 % cream Apply topically 2 times daily. 30 g 1    Continuous Blood Gluc  (DEXCOM G6 ) WILLIAN Check BS 4 times daily 1 Device 0    Continuous Blood Gluc Sensor (DEXCOM G6 SENSOR) MISC Check BS 4-5 times daily 1 each 5    blood glucose monitor kit and supplies Use 3-4 times daily 1 kit 0    blood glucose monitor strips Test 3-4 times a day & as needed for symptoms of irregular blood glucose. 100 strip 5    Glucosource Lancets MISC Use one 3-4 times to check blood sugar 100 each 5    ibuprofen (ADVIL;MOTRIN) 800 MG tablet Take 1 tablet by mouth 2 times daily as needed for Pain 60 tablet 3    hydroCHLOROthiazide (HYDRODIURIL) 12.5 MG tablet Take 1 tablet by mouth every morning 30 tablet 0    insulin lispro, 1 Unit Dial, (HUMALOG KWIKPEN) 100 UNIT/ML SOPN Inject 5 Units into the skin 3 times daily (before meals) 5 pen 5     No current facility-administered medications for this visit. Review of Systems   Constitutional:  Positive for activity change. Negative for chills and fever. HENT:  Negative for congestion. Respiratory:  Negative for cough and shortness of breath. Cardiovascular:  Negative for chest pain and leg swelling. Gastrointestinal:  Negative for abdominal pain. Genitourinary:  Negative for dysuria and flank pain. Musculoskeletal:  Positive for back pain. Neurological:  Negative for numbness and headaches. Psychiatric/Behavioral:  Positive for agitation and sleep disturbance. The patient is nervous/anxious.       Lab Results   Component Value Date    WBC 8.7 05/28/2021    HGB 17.1 05/28/2021    HCT 48.5 05/28/2021    MCV 88.0 05/28/2021     05/28/2021     Lab Results   Component Value Date     05/28/2021    K 3.7 05/28/2021    CL 98 (L) 05/28/2021    CO2 26 05/28/2021    BUN 6 05/28/2021    CREATININE 0.7 (L) 05/28/2021    GLUCOSE 116 (H) 09/27/2019    CALCIUM 9.2 05/28/2021    PROT 6.6 05/28/2021    LABALBU 4.2 05/28/2021    BILITOT 0.9 05/28/2021    ALKPHOS 104 05/28/2021    AST 63 (H) 05/28/2021    ALT 96 (H) 05/28/2021    LABGLOM >60 05/28/2021    GFRAA >60 05/28/2021     Lab Results   Component Value Date    CHOL 131 05/28/2021    CHOL 125 06/07/2018    CHOL 135 03/22/2018     Lab Results   Component Value Date    TRIG 173 (H) 05/28/2021    TRIG 91 06/07/2018    TRIG 166 (H) 03/22/2018     Lab Results   Component Value Date    HDL 35 (L) 05/28/2021    HDL 33 (L) 06/07/2018    HDL 33 (L) 03/22/2018     Lab Results   Component Value Date    LDLCALC 32 04/22/2017     Lab Results   Component Value Date    LABA1C 9.9 08/18/2022     Lab Results   Component Value Date    TSHHS 1.650 05/28/2021         /80 (Site: Left Upper Arm, Position: Sitting, Cuff Size: Large Adult)   Pulse 79   Wt (!) 302 lb 3.2 oz (137.1 kg)   SpO2 97%   BMI 38.80 kg/m²     BP Readings from Last 3 Encounters:   08/18/22 120/80   04/09/22 (!) 153/105   09/07/21 138/82       Wt Readings from Last 3 Encounters:   08/18/22 (!) 302 lb 3.2 oz (137.1 kg)   09/07/21 (!) 313 lb (142 kg)   07/19/21 (!) 306 lb 3.2 oz (138.9 kg)         Physical Exam  Constitutional:       General: He is not in acute distress. Appearance: Normal appearance. He is well-developed. He is obese. He is not diaphoretic. Comments: He is in pain   HENT:      Head: Normocephalic and atraumatic. Eyes:      Pupils: Pupils are equal, round, and reactive to light. Cardiovascular:      Rate and Rhythm: Normal rate and regular rhythm. Heart sounds: Normal heart sounds. No murmur heard. Pulmonary:      Effort: Pulmonary effort is normal.      Breath sounds: Normal breath sounds. No wheezing. Musculoskeletal:         General: Normal range of motion. Cervical back: Normal range of motion and neck supple. No rigidity. Right lower leg: No edema. Left lower leg: No edema. Neurological:      General: No focal deficit present. Mental Status: He is alert and oriented to person, place, and time. Psychiatric:         Mood and Affect: Mood normal.         Behavior: Behavior normal.         Thought Content: Thought content normal.         Judgment: Judgment normal.       ASSESSMENT/ PLAN:    1. Uncontrolled type 2 diabetes mellitus with hyperglycemia (HCC)  -A1c went up, and does not take the NovoLog so he need to take the NovoLog 5 units plus the sliding scale. Keep taking the and Lantus 80 units at night, the glimepiride 1 mg twice a day  Also we did add the Trulicity. - POCT glycosylated hemoglobin (Hb A1C)  - Dulaglutide (TRULICITY) 3.15 EU/1.6EU SOPN; Inject 0.75 mg into the skin once a week  Dispense: 4 pen; Refill: 0  - [facility] (Ambulatory)    2. Essential hypertension  -Stable taking metoprolol and Cardizem    3. Anxiety and depression  -Is not under control but denies any suicidal idea or plan, will increase her Zoloft and we will add the Cymbalta  - sertraline (ZOLOFT) 50 MG tablet; TAKE 1 TABLET BY MOUTH EVERY DAY  Dispense: 30 tablet; Refill: 5  - DULoxetine (CYMBALTA) 30 MG extended release capsule; Take 1 capsule by mouth daily  Dispense: 30 capsule; Refill: 3    4. Gastroesophageal reflux disease without esophagitis  -Stable he is on a Protonix    5. Chronic midline low back pain with sciatica, sciatica laterality unspecified  -We will give him just this time pain medication he does follow-up with the neurosurgeon who decided to do the surgery but because of the his a blood sugar was uncontrolled they cannot do the surgery  - DULoxetine (CYMBALTA) 30 MG extended release capsule; Take 1 capsule by mouth daily  Dispense: 30 capsule;  Refill: 3  - HYDROcodone-acetaminophen (NORCO) 5-325 MG per tablet; Take 1 tablet by mouth every 6 hours as needed for Pain for up to 7 days. Intended supply: 7 days. Take lowest dose possible to manage pain  Dispense: 28 tablet; Refill: 0            - All old blood work reviewed with the patient  - Appropriate prescription are addressed. - After visit summery provided. - Questions answered and patient verbalizes understanding.  - Call for any problem, questions, or concerns.  - RTC if symptoms worse. Return in about 6 weeks (around 9/29/2022).

## 2022-08-19 ASSESSMENT — ENCOUNTER SYMPTOMS
ABDOMINAL PAIN: 0
SHORTNESS OF BREATH: 0
COUGH: 0
BACK PAIN: 1

## 2022-09-19 RX ORDER — INSULIN LISPRO 100 [IU]/ML
INJECTION, SOLUTION INTRAVENOUS; SUBCUTANEOUS
Qty: 15 ML | Refills: 0 | Status: SHIPPED | OUTPATIENT
Start: 2022-09-19

## 2022-10-13 ENCOUNTER — OFFICE VISIT (OUTPATIENT)
Dept: FAMILY MEDICINE CLINIC | Age: 32
End: 2022-10-13
Payer: COMMERCIAL

## 2022-10-13 VITALS
DIASTOLIC BLOOD PRESSURE: 80 MMHG | OXYGEN SATURATION: 98 % | BODY MASS INDEX: 38.48 KG/M2 | WEIGHT: 299.8 LBS | HEIGHT: 74 IN | HEART RATE: 78 BPM | SYSTOLIC BLOOD PRESSURE: 120 MMHG

## 2022-10-13 DIAGNOSIS — M79.641 PAIN IN BOTH HANDS: ICD-10-CM

## 2022-10-13 DIAGNOSIS — J40 BRONCHITIS: ICD-10-CM

## 2022-10-13 DIAGNOSIS — R05.1 ACUTE COUGH: ICD-10-CM

## 2022-10-13 DIAGNOSIS — G89.29 CHRONIC MIDLINE LOW BACK PAIN WITH SCIATICA, SCIATICA LATERALITY UNSPECIFIED: ICD-10-CM

## 2022-10-13 DIAGNOSIS — F32.A ANXIETY AND DEPRESSION: ICD-10-CM

## 2022-10-13 DIAGNOSIS — K21.9 GASTROESOPHAGEAL REFLUX DISEASE WITHOUT ESOPHAGITIS: ICD-10-CM

## 2022-10-13 DIAGNOSIS — M54.40 CHRONIC MIDLINE LOW BACK PAIN WITH SCIATICA, SCIATICA LATERALITY UNSPECIFIED: ICD-10-CM

## 2022-10-13 DIAGNOSIS — I10 ESSENTIAL HYPERTENSION: ICD-10-CM

## 2022-10-13 DIAGNOSIS — F41.9 ANXIETY AND DEPRESSION: ICD-10-CM

## 2022-10-13 DIAGNOSIS — L30.9 DERMATITIS: ICD-10-CM

## 2022-10-13 DIAGNOSIS — E11.9 NEW ONSET TYPE 2 DIABETES MELLITUS (HCC): ICD-10-CM

## 2022-10-13 DIAGNOSIS — E11.9 TYPE 2 DIABETES MELLITUS WITHOUT COMPLICATION, WITHOUT LONG-TERM CURRENT USE OF INSULIN (HCC): Primary | ICD-10-CM

## 2022-10-13 DIAGNOSIS — M79.642 PAIN IN BOTH HANDS: ICD-10-CM

## 2022-10-13 LAB — HBA1C MFR BLD: 7.7 %

## 2022-10-13 PROCEDURE — 99214 OFFICE O/P EST MOD 30 MIN: CPT | Performed by: FAMILY MEDICINE

## 2022-10-13 PROCEDURE — 2022F DILAT RTA XM EVC RTNOPTHY: CPT | Performed by: FAMILY MEDICINE

## 2022-10-13 PROCEDURE — G8417 CALC BMI ABV UP PARAM F/U: HCPCS | Performed by: FAMILY MEDICINE

## 2022-10-13 PROCEDURE — 3051F HG A1C>EQUAL 7.0%<8.0%: CPT | Performed by: FAMILY MEDICINE

## 2022-10-13 PROCEDURE — 83036 HEMOGLOBIN GLYCOSYLATED A1C: CPT | Performed by: FAMILY MEDICINE

## 2022-10-13 PROCEDURE — 4004F PT TOBACCO SCREEN RCVD TLK: CPT | Performed by: FAMILY MEDICINE

## 2022-10-13 PROCEDURE — G8427 DOCREV CUR MEDS BY ELIG CLIN: HCPCS | Performed by: FAMILY MEDICINE

## 2022-10-13 PROCEDURE — G8484 FLU IMMUNIZE NO ADMIN: HCPCS | Performed by: FAMILY MEDICINE

## 2022-10-13 RX ORDER — IBUPROFEN 800 MG/1
800 TABLET ORAL 2 TIMES DAILY PRN
Qty: 60 TABLET | Refills: 5 | Status: SHIPPED | OUTPATIENT
Start: 2022-10-13

## 2022-10-13 RX ORDER — GLIMEPIRIDE 1 MG/1
TABLET ORAL
Qty: 180 TABLET | Refills: 3 | Status: SHIPPED | OUTPATIENT
Start: 2022-10-13

## 2022-10-13 RX ORDER — DULOXETIN HYDROCHLORIDE 30 MG/1
30 CAPSULE, DELAYED RELEASE ORAL DAILY
Qty: 90 CAPSULE | Refills: 3 | Status: SHIPPED | OUTPATIENT
Start: 2022-10-13

## 2022-10-13 RX ORDER — PANTOPRAZOLE SODIUM 40 MG/1
TABLET, DELAYED RELEASE ORAL
Qty: 180 TABLET | Refills: 3 | Status: SHIPPED | OUTPATIENT
Start: 2022-10-13

## 2022-10-13 RX ORDER — GUAIFENESIN 600 MG/1
1200 TABLET, EXTENDED RELEASE ORAL 2 TIMES DAILY
Qty: 40 TABLET | Refills: 0 | Status: SHIPPED | OUTPATIENT
Start: 2022-10-13 | End: 2022-10-23

## 2022-10-13 RX ORDER — TOPIRAMATE 50 MG/1
TABLET, FILM COATED ORAL
Qty: 180 TABLET | Refills: 3 | Status: SHIPPED | OUTPATIENT
Start: 2022-10-13

## 2022-10-13 RX ORDER — AZITHROMYCIN 250 MG/1
250 TABLET, FILM COATED ORAL SEE ADMIN INSTRUCTIONS
Qty: 6 TABLET | Refills: 0 | Status: SHIPPED | OUTPATIENT
Start: 2022-10-13 | End: 2022-10-18

## 2022-10-13 RX ORDER — INSULIN GLARGINE 100 [IU]/ML
80 INJECTION, SOLUTION SUBCUTANEOUS NIGHTLY
Qty: 15 ML | Refills: 5 | Status: SHIPPED | OUTPATIENT
Start: 2022-10-13 | End: 2022-11-12

## 2022-10-17 PROBLEM — J40 BRONCHITIS: Status: ACTIVE | Noted: 2022-10-17

## 2022-10-17 PROBLEM — M79.642 PAIN IN BOTH HANDS: Status: ACTIVE | Noted: 2022-10-17

## 2022-10-17 PROBLEM — M79.641 PAIN IN BOTH HANDS: Status: ACTIVE | Noted: 2022-10-17

## 2022-10-17 PROBLEM — R05.1 ACUTE COUGH: Status: ACTIVE | Noted: 2022-10-17

## 2022-10-17 ASSESSMENT — ENCOUNTER SYMPTOMS
SINUS PRESSURE: 1
SORE THROAT: 1
BACK PAIN: 1
COUGH: 1
ABDOMINAL PAIN: 0
SHORTNESS OF BREATH: 0
WHEEZING: 1

## 2022-12-28 ENCOUNTER — TELEPHONE (OUTPATIENT)
Dept: FAMILY MEDICINE CLINIC | Age: 32
End: 2022-12-28

## 2022-12-28 NOTE — TELEPHONE ENCOUNTER
----- Message from [employer] sent at 2022  2:05 PM EST -----  Subject: Message to Provider    QUESTIONS  Information for Provider? Pt was supposed to get a script for a breathing   machine and wants to check the status of it. Pt is still having   difficulties w/ his chest as far as the cough and a bubbly wheezing that   has become worse. Pt stated that his inhaler  and he doesn't have   it anymore. Preferred Pharm? [facility] in [place]  ---------------------------------------------------------------------------  --------------  [phone]  [phone]; OK to leave message on voicemail  ---------------------------------------------------------------------------  --------------  SCRIPT ANSWERS  Relationship to Patient?  Self

## 2023-01-03 RX ORDER — SOFT LENS DISINFECTANT
SOLUTION, NON-ORAL MISCELLANEOUS
Qty: 1 EACH | Refills: 0 | Status: SHIPPED | OUTPATIENT
Start: 2023-01-03

## 2023-01-09 ENCOUNTER — APPOINTMENT (OUTPATIENT)
Dept: CT IMAGING | Age: 33
End: 2023-01-09
Payer: COMMERCIAL

## 2023-01-09 ENCOUNTER — HOSPITAL ENCOUNTER (EMERGENCY)
Age: 33
Discharge: HOME | End: 2023-01-09
Attending: STUDENT IN AN ORGANIZED HEALTH CARE EDUCATION/TRAINING PROGRAM
Payer: COMMERCIAL

## 2023-01-09 ENCOUNTER — APPOINTMENT (OUTPATIENT)
Dept: GENERAL RADIOLOGY | Age: 33
End: 2023-01-09
Payer: COMMERCIAL

## 2023-01-09 VITALS
DIASTOLIC BLOOD PRESSURE: 95 MMHG | OXYGEN SATURATION: 94 % | BODY MASS INDEX: 40.43 KG/M2 | TEMPERATURE: 97 F | HEIGHT: 74 IN | SYSTOLIC BLOOD PRESSURE: 142 MMHG | RESPIRATION RATE: 18 BRPM | HEART RATE: 90 BPM | WEIGHT: 315 LBS

## 2023-01-09 DIAGNOSIS — U09.9 POST-COVID SYNDROME: ICD-10-CM

## 2023-01-09 DIAGNOSIS — R06.02 SHORTNESS OF BREATH: Primary | ICD-10-CM

## 2023-01-09 DIAGNOSIS — R05.9 COUGH: ICD-10-CM

## 2023-01-09 DIAGNOSIS — J06.9 VIRAL URI WITH COUGH: ICD-10-CM

## 2023-01-09 LAB
ALBUMIN SERPL-MCNC: 4.1 GM/DL (ref 3.4–5)
ALP BLD-CCNC: 118 IU/L (ref 40–129)
ALT SERPL-CCNC: 45 U/L (ref 10–40)
ANION GAP SERPL CALCULATED.3IONS-SCNC: 10 MMOL/L (ref 4–16)
AST SERPL-CCNC: 28 IU/L (ref 15–37)
BASE EXCESS MIXED: 2.5 (ref 0–1.2)
BASE EXCESS: ABNORMAL (ref 0–3.3)
BASOPHILS ABSOLUTE: 0.1 K/CU MM
BASOPHILS RELATIVE PERCENT: 0.9 % (ref 0–1)
BILIRUB SERPL-MCNC: 0.2 MG/DL (ref 0–1)
BUN BLDV-MCNC: 9 MG/DL (ref 6–23)
CALCIUM SERPL-MCNC: 9.4 MG/DL (ref 8.3–10.6)
CHLORIDE BLD-SCNC: 101 MMOL/L (ref 99–110)
CO2 CONTENT: 25.2 MMOL/L (ref 19–24)
CO2: 25 MMOL/L (ref 21–32)
CREAT SERPL-MCNC: 0.9 MG/DL (ref 0.9–1.3)
DIFFERENTIAL TYPE: ABNORMAL
EOSINOPHILS ABSOLUTE: 0.3 K/CU MM
EOSINOPHILS RELATIVE PERCENT: 2.3 % (ref 0–3)
GFR SERPL CREATININE-BSD FRML MDRD: >60 ML/MIN/1.73M2
GLUCOSE BLD-MCNC: 208 MG/DL (ref 70–99)
HCO3 VENOUS: 23.8 MMOL/L (ref 19–25)
HCT VFR BLD CALC: 47.4 % (ref 42–52)
HEMOGLOBIN: 16.5 GM/DL (ref 13.5–18)
IMMATURE NEUTROPHIL %: 0.3 % (ref 0–0.43)
LYMPHOCYTES ABSOLUTE: 2.2 K/CU MM
LYMPHOCYTES RELATIVE PERCENT: 19 % (ref 24–44)
MCH RBC QN AUTO: 30.3 PG (ref 27–31)
MCHC RBC AUTO-ENTMCNC: 34.8 % (ref 32–36)
MCV RBC AUTO: 87.1 FL (ref 78–100)
MONOCYTES ABSOLUTE: 1 K/CU MM
MONOCYTES RELATIVE PERCENT: 8.8 % (ref 0–4)
O2 SAT, VEN: 74.8 % (ref 50–70)
PCO2, VEN: 45.5 MMHG (ref 38–52)
PDW BLD-RTO: 13.1 % (ref 11.7–14.9)
PH VENOUS: 7.33 (ref 7.32–7.42)
PLATELET # BLD: 258 K/CU MM (ref 140–440)
PMV BLD AUTO: 10.2 FL (ref 7.5–11.1)
PO2, VEN: 43.1 MMHG (ref 28–48)
POTASSIUM SERPL-SCNC: 3.8 MMOL/L (ref 3.5–5.1)
PRO-BNP: <36 PG/ML
RAPID INFLUENZA  B AGN: NEGATIVE
RAPID INFLUENZA A AGN: NEGATIVE
RBC # BLD: 5.44 M/CU MM (ref 4.6–6.2)
SARS-COV-2, NAAT: NOT DETECTED
SEGMENTED NEUTROPHILS ABSOLUTE COUNT: 8 K/CU MM
SEGMENTED NEUTROPHILS RELATIVE PERCENT: 68.7 % (ref 36–66)
SODIUM BLD-SCNC: 136 MMOL/L (ref 135–145)
SOURCE, BLOOD GAS: ABNORMAL
SOURCE: NORMAL
TOTAL IMMATURE NEUTOROPHIL: 0.04 K/CU MM
TOTAL PROTEIN: 7.8 GM/DL (ref 6.4–8.2)
TROPONIN T: <0.01 NG/ML
WBC # BLD: 11.6 K/CU MM (ref 4–10.5)

## 2023-01-09 PROCEDURE — 96375 TX/PRO/DX INJ NEW DRUG ADDON: CPT

## 2023-01-09 PROCEDURE — 87635 SARS-COV-2 COVID-19 AMP PRB: CPT

## 2023-01-09 PROCEDURE — 71046 X-RAY EXAM CHEST 2 VIEWS: CPT

## 2023-01-09 PROCEDURE — 71275 CT ANGIOGRAPHY CHEST: CPT

## 2023-01-09 PROCEDURE — 83880 ASSAY OF NATRIURETIC PEPTIDE: CPT

## 2023-01-09 PROCEDURE — 99285 EMERGENCY DEPT VISIT HI MDM: CPT

## 2023-01-09 PROCEDURE — 6360000002 HC RX W HCPCS: Performed by: STUDENT IN AN ORGANIZED HEALTH CARE EDUCATION/TRAINING PROGRAM

## 2023-01-09 PROCEDURE — 84484 ASSAY OF TROPONIN QUANT: CPT

## 2023-01-09 PROCEDURE — 80053 COMPREHEN METABOLIC PANEL: CPT

## 2023-01-09 PROCEDURE — 6360000004 HC RX CONTRAST MEDICATION: Performed by: STUDENT IN AN ORGANIZED HEALTH CARE EDUCATION/TRAINING PROGRAM

## 2023-01-09 PROCEDURE — 96365 THER/PROPH/DIAG IV INF INIT: CPT

## 2023-01-09 PROCEDURE — 2580000003 HC RX 258: Performed by: STUDENT IN AN ORGANIZED HEALTH CARE EDUCATION/TRAINING PROGRAM

## 2023-01-09 PROCEDURE — 87804 INFLUENZA ASSAY W/OPTIC: CPT

## 2023-01-09 PROCEDURE — 93005 ELECTROCARDIOGRAM TRACING: CPT | Performed by: STUDENT IN AN ORGANIZED HEALTH CARE EDUCATION/TRAINING PROGRAM

## 2023-01-09 PROCEDURE — 85025 COMPLETE CBC W/AUTO DIFF WBC: CPT

## 2023-01-09 PROCEDURE — 82805 BLOOD GASES W/O2 SATURATION: CPT

## 2023-01-09 RX ORDER — 0.9 % SODIUM CHLORIDE 0.9 %
1000 INTRAVENOUS SOLUTION INTRAVENOUS ONCE
Status: COMPLETED | OUTPATIENT
Start: 2023-01-09 | End: 2023-01-09

## 2023-01-09 RX ORDER — METHYLPREDNISOLONE SODIUM SUCCINATE 125 MG/2ML
125 INJECTION, POWDER, LYOPHILIZED, FOR SOLUTION INTRAMUSCULAR; INTRAVENOUS ONCE
Status: COMPLETED | OUTPATIENT
Start: 2023-01-09 | End: 2023-01-09

## 2023-01-09 RX ORDER — ALBUTEROL SULFATE 2.5 MG/3ML
10 SOLUTION RESPIRATORY (INHALATION) ONCE
Status: COMPLETED | OUTPATIENT
Start: 2023-01-09 | End: 2023-01-09

## 2023-01-09 RX ORDER — AZITHROMYCIN 250 MG/1
250 TABLET, FILM COATED ORAL SEE ADMIN INSTRUCTIONS
Qty: 6 TABLET | Refills: 0 | Status: SHIPPED | OUTPATIENT
Start: 2023-01-09 | End: 2023-01-14

## 2023-01-09 RX ORDER — ALBUTEROL SULFATE 90 UG/1
2 AEROSOL, METERED RESPIRATORY (INHALATION) 4 TIMES DAILY PRN
Qty: 18 G | Refills: 5 | Status: SHIPPED | OUTPATIENT
Start: 2023-01-09

## 2023-01-09 RX ORDER — MAGNESIUM SULFATE IN WATER 40 MG/ML
2000 INJECTION, SOLUTION INTRAVENOUS ONCE
Status: COMPLETED | OUTPATIENT
Start: 2023-01-09 | End: 2023-01-09

## 2023-01-09 RX ADMIN — SODIUM CHLORIDE 1000 ML: 9 INJECTION, SOLUTION INTRAVENOUS at 21:12

## 2023-01-09 RX ADMIN — MAGNESIUM SULFATE HEPTAHYDRATE 2000 MG: 2 INJECTION, SOLUTION INTRAVENOUS at 19:45

## 2023-01-09 RX ADMIN — METHYLPREDNISOLONE SODIUM SUCCINATE 125 MG: 125 INJECTION, POWDER, FOR SOLUTION INTRAMUSCULAR; INTRAVENOUS at 19:40

## 2023-01-09 RX ADMIN — IOPAMIDOL 100 ML: 755 INJECTION, SOLUTION INTRAVENOUS at 21:50

## 2023-01-09 RX ADMIN — ALBUTEROL SULFATE 10 MG: 2.5 SOLUTION RESPIRATORY (INHALATION) at 19:48

## 2023-01-09 RX ADMIN — IPRATROPIUM BROMIDE 0.5 MG: 0.5 SOLUTION RESPIRATORY (INHALATION) at 19:49

## 2023-01-10 LAB
EKG ATRIAL RATE: 92 BPM
EKG DIAGNOSIS: NORMAL
EKG P AXIS: 29 DEGREES
EKG P-R INTERVAL: 148 MS
EKG Q-T INTERVAL: 354 MS
EKG QRS DURATION: 82 MS
EKG QTC CALCULATION (BAZETT): 437 MS
EKG R AXIS: -38 DEGREES
EKG T AXIS: 37 DEGREES
EKG VENTRICULAR RATE: 92 BPM

## 2023-01-10 PROCEDURE — 93010 ELECTROCARDIOGRAM REPORT: CPT | Performed by: INTERNAL MEDICINE

## 2023-01-10 NOTE — ED PROVIDER NOTES
Emergency Department Encounter    Patient: Heike Rees  MRN: [id number]  : 1990  Date of Evaluation: 1/10/2023  ED Provider:  Kennis Gitelman, DO    Triage Chief Complaint:   Cough    [place]:  Heike Rees is a 29 y.o. male with a past medical history of hypertension, hyperlipidemia, diabetes, depression that presents to the ED with a history of flulike illness for the past 3 months. Patient endorses cough and congestion. Patient admits to history of smoking cigarettes as well as vaping. No history of chest pain, fever, lightheadedness, dizziness, orthopnea, nausea, diaphoresis, recent long distance travel or prolonged immobility.     ROS - see HPI, below listed is current ROS at time of my eval:  ROS is an in history; otherwise unremarkable    Past Medical History:   Diagnosis Date    Depression 2016    Essential hypertension 2016    Hypertriglyceridemia 2017    New onset type 2 diabetes mellitus ([facility] [address] 2021     Past Surgical History:   Procedure Laterality Date    CHOLECYSTECTOMY, LAPAROSCOPIC  2018     Family History   Problem Relation Age of Onset    Diabetes Maternal Uncle     Stroke Maternal Uncle     Cancer Maternal Grandmother     Heart Disease Maternal Grandmother     Stroke Maternal Grandmother     Stroke Maternal Grandfather      Social History     Socioeconomic History    Marital status:      Spouse name: Not on file    Number of children: Not on file    Years of education: Not on file    Highest education level: Not on file   Occupational History    Not on file   Tobacco Use    Smoking status: Every Day     Packs/day: 0.25     Years: 9.00     Pack years: 2.25     Types: E-Cigarettes, Cigarettes    Smokeless tobacco: Never    Tobacco comments:     smokes vape   Vaping Use    Vaping Use: Every day    Substances: Always   Substance and Sexual Activity    Alcohol use: Yes     Comment: socially    Drug use: No    Sexual activity: Yes Partners: Female   Other Topics Concern    Not on file   Social History Narrative    Not on file     Social Determinants of Health     Financial Resource Strain: Not on file   Food Insecurity: Not on file   Transportation Needs: Not on file   Physical Activity: Not on file   Stress: Not on file   Social Connections: Not on file   Intimate Partner Violence: Not on file   Housing Stability: Not on file     No current facility-administered medications for this encounter.      Current Outpatient Medications   Medication Sig Dispense Refill    azithromycin (ZITHROMAX) 250 MG tablet Take 1 tablet by mouth See Admin Instructions for 5 days 500mg on day 1 followed by 250mg on days 2 - 5 6 tablet 0    albuterol sulfate HFA (PROVENTIL;VENTOLIN;PROAIR) 108 (90 Base) MCG/ACT inhaler Inhale 2 puffs into the lungs 4 times daily as needed for Wheezing 18 g 5    Respiratory Therapy Supplies (NEBULIZER) WILLIAN Use the machine every 4 hrs as needed for wheezing or SOB 1 each 0    metoprolol succinate (TOPROL XL) 50 MG extended release tablet Take 1 tablet by mouth daily REFILL ONE TIME UNTIL SEES DR 90 tablet 0    lisinopril (PRINIVIL;ZESTRIL) 20 MG tablet Take 1 tablet by mouth daily REFILLED 1 TIME UNTIL SEES DR. 90 tablet 0    cloNIDine (CATAPRES) 0.1 MG tablet TAKE ONE TABLET BY MOUTH TWICE DAILY 180 tablet 0    dilTIAZem (CARDIZEM CD) 120 MG extended release capsule Take 1 capsule by mouth daily 90 capsule 0    traZODone (DESYREL) 50 MG tablet Take 1 tablet by mouth nightly as needed for Sleep 30 tablet 5    Insulin Pen Needle 29G X 12MM MISC 1 each by Does not apply route daily 100 each 3    Dulaglutide 1.5 MG/0.5ML SOPN Inject 1.5 mg into the skin once a week 12 Adjustable Dose Pre-filled Pen Syringe 3    sertraline (ZOLOFT) 50 MG tablet TAKE 1 TABLET BY MOUTH EVERY DAY 90 tablet 3    DULoxetine (CYMBALTA) 30 MG extended release capsule Take 1 capsule by mouth daily 90 capsule 3    pantoprazole (PROTONIX) 40 MG tablet TAKE 1 TABLET BY MOUTH TWO TIMES A  tablet 3    glimepiride (AMARYL) 1 MG tablet TAKE 1 TABLET BY MOUTH TWO TIMES A  tablet 3    topiramate (TOPAMAX) 50 MG tablet TAKE 1 TABLET BY MOUTH TWO TIMES DAILY 180 tablet 3    insulin glargine (LANTUS SOLOSTAR) 100 UNIT/ML injection pen Inject 80 Units into the skin nightly 15 mL 5    ibuprofen (ADVIL;MOTRIN) 800 MG tablet Take 1 tablet by mouth 2 times daily as needed for Pain 60 tablet 5    insulin lispro, 1 Unit Dial, 100 UNIT/ML SOPN INJECT 5 UNITS INTO THE SKIN 3 TIMES DAILY BEFORE MEALS 15 mL 0    Continuous Blood Gluc  (DEXCOM G6 ) WILLIAN Check BS 4 times daily 1 Device 0    Continuous Blood Gluc Sensor (DEXCOM G6 SENSOR) MISC Check BS 4-5 times daily 1 each 5    blood glucose monitor kit and supplies Use 3-4 times daily 1 kit 0    blood glucose monitor strips Test 3-4 times a day & as needed for symptoms of irregular blood glucose. 100 strip 5    Glucosource Lancets MISC Use one 3-4 times to check blood sugar 100 each 5     Allergies   Allergen Reactions    Tape [Adhesive Tape] Rash     And tegaderms       Nursing Notes Reviewed    Physical Exam:  Triage VS:    ED Triage Vitals   Enc Vitals Group      BP 01/09/23 1822 (!) 146/101      Heart Rate 01/09/23 1822 97      Resp 01/09/23 1822 16      Temp 01/09/23 1822 97 °F (36.1 °C)      Temp Source 01/09/23 1822 Infrared      SpO2 01/09/23 1822 94 %      Weight 01/09/23 1818 (!) 315 lb (142.9 kg)      Height 01/09/23 1818 6' 2\" (1.88 m)      Head Circumference --       Peak Flow --       Pain Score --       Pain Loc --       Pain Edu? --       Excl. in [address]? --        My pulse ox interpretation is - normal    General appearance: Mild acute respiratory distress. Skin:  Warm. Dry. Eye:  Extraocular movements intact. Ears, nose, mouth and throat:  Oral mucosa moist   Neck:  Trachea midline. Extremity:  No obvious deformity, erythema, or warmth. No swelling. Normal ROM. Distal pulses intact. Heart:  Regular rate and rhythm, normal S1 & S2, no extra heart sounds. Perfusion:  intact  Respiratory: Bilateral wheezing. Respirations nonlabored. Abdominal:  Normal bowel sounds. Soft. Nontender. Non distended. Back:  No CVA tenderness to palpation     Neurological:  Alert and oriented times 3. No focal neuro deficits.              Psychiatric:  Appropriate    I have reviewed and interpreted all of the currently available lab results from this visit (if applicable):  Results for orders placed or performed during the hospital encounter of 01/09/23   COVID-19, Rapid    Specimen: Nasopharyngeal   Result Value Ref Range    Source NARES     SARS-CoV-2, NAAT NOT DETECTED NOT DETECTED   Rapid influenza A/B antigens    Specimen: Nasopharyngeal   Result Value Ref Range    Rapid Influenza A Ag NEGATIVE NEGATIVE    Rapid Influenza B Ag NEGATIVE NEGATIVE   CBC with Auto Differential   Result Value Ref Range    WBC 11.6 (H) 4.0 - 10.5 K/CU MM    RBC 5.44 4.6 - 6.2 M/CU MM    Hemoglobin 16.5 13.5 - 18.0 GM/DL    Hematocrit 47.4 42 - 52 %    MCV 87.1 78 - 100 FL    MCH 30.3 27 - 31 PG    MCHC 34.8 32.0 - 36.0 %    RDW 13.1 11.7 - 14.9 %    Platelets 275 663 - 811 K/CU MM    MPV 10.2 7.5 - 11.1 FL    Differential Type AUTOMATED DIFFERENTIAL     Segs Relative 68.7 (H) 36 - 66 %    Lymphocytes % 19.0 (L) 24 - 44 %    Monocytes % 8.8 (H) 0 - 4 %    Eosinophils % 2.3 0 - 3 %    Basophils % 0.9 0 - 1 %    Segs Absolute 8.0 K/CU MM    Lymphocytes Absolute 2.2 K/CU MM    Monocytes Absolute 1.0 K/CU MM    Eosinophils Absolute 0.3 K/CU MM    Basophils Absolute 0.1 K/CU MM    Immature Neutrophil % 0.3 0 - 0.43 %    Total Immature Neutrophil 0.04 K/CU MM   CMP   Result Value Ref Range    Sodium 136 135 - 145 MMOL/L    Potassium 3.8 3.5 - 5.1 MMOL/L    Chloride 101 99 - 110 mMol/L    CO2 25 21 - 32 MMOL/L    BUN 9 6 - 23 MG/DL    Creatinine 0.9 0.9 - 1.3 MG/DL    Est, Glom Filt Rate >60 >60 mL/min/1.73m2    Glucose 208 (H) 70 - 99 MG/DL    Calcium 9.4 8.3 - 10.6 MG/DL    Albumin 4.1 3.4 - 5.0 GM/DL    Total Protein 7.8 6.4 - 8.2 GM/DL    Total Bilirubin 0.2 0.0 - 1.0 MG/DL    ALT 45 (H) 10 - 40 U/L    AST 28 15 - 37 IU/L    Alkaline Phosphatase 118 40 - 129 IU/L    Anion Gap 10 4 - 16   Troponin   Result Value Ref Range    Troponin T <0.010 <0.01 NG/ML   Brain Natriuretic Peptide   Result Value Ref Range    Pro-BNP <36.00 <300 PG/ML   POCT Venous   Result Value Ref Range    pH, Karthik 7.33 7.32 - 7.42    pCO2, Karthik 45.5 38 - 52 mmHG    pO2, Karthik 43.1 28 - 48 mmHG    Base Exc, Mixed 2.5 (H) 0 - 1.2    Base Excess MINUS 0 - 3.3    HCO3, Venous 23.8 19 - 25 MMOL/L    O2 Sat, Karthik 74.8 (H) 50 - 70 %    CO2 Content 25.2 (H) 19 - 24 MMOL/L    Source: Venous    EKG 12 Lead   Result Value Ref Range    Ventricular Rate 92 BPM    Atrial Rate 92 BPM    P-R Interval 148 ms    QRS Duration 82 ms    Q-T Interval 354 ms    QTc Calculation (Bazett) 437 ms    P Axis 29 degrees    R Axis -38 degrees    T Axis 37 degrees    Diagnosis       Normal sinus rhythm with sinus arrhythmia  Left axis deviation  Pulmonary disease pattern  Abnormal ECG  When compared with ECG of 19-AUG-2017 16:34,  No significant change was found        Radiographs (if obtained):  Radiologist's Report Reviewed:  XR CHEST (2 VW)    Result Date: 1/9/2023  EXAMINATION: TWO XRAY VIEWS OF THE CHEST 1/9/2023 6:42 pm COMPARISON: Chest radiograph from August 19, 2017. HISTORY: ORDERING SYSTEM PROVIDED HISTORY: Cough for 3 months TECHNOLOGIST PROVIDED HISTORY: Reason for exam:->Cough for 3 months FINDINGS: Support devices: None. No focal consolidations, effusions, or sizable pneumothorax. Cardiomediastinal silhouette is within normal limits. No acute findings in the bones or soft tissues. No acute cardiopulmonary process.      CTA PULMONARY W CONTRAST    Result Date: 1/9/2023  EXAMINATION: CTA OF THE CHEST 1/9/2023 9:49 pm TECHNIQUE: CTA of the chest was performed after the administration of intravenous contrast.  Multiplanar reformatted images are provided for review. MIP images are provided for review. Automated exposure control, iterative reconstruction, and/or weight based adjustment of the mA/kV was utilized to reduce the radiation dose to as low as reasonably achievable. COMPARISON: Chest radiograph 01/09/2023. HISTORY: ORDERING SYSTEM PROVIDED HISTORY: sob, tachycardia, TECHNOLOGIST PROVIDED HISTORY: Reason for exam:->sob, tachycardia, Decision Support Exception - unselect if not a suspected or confirmed emergency medical condition->Emergency Medical Condition (MA) Reason for Exam: repeat study per radiologist. SOB, tachycardia FINDINGS: Pulmonary Arteries: Pulmonary arteries are adequately opacified for evaluation. No evidence of intraluminal filling defect to suggest pulmonary embolism. Main pulmonary artery is normal in caliber. Mediastinum: The thoracic aorta is unremarkable. The coronary arteries and branch vessels of the superior mediastinum and lower neck are unremarkable. The heart is normal in size. No pericardial effusion. The mediastinal esophagus and visualized aspects of the thyroid gland are unremarkable. No lymphadenopathy or pneumomediastinum. Lungs/pleura: The central airways are patent. No pleural effusion or pneumothorax. There are small tree-in-bud opacities in the posterior aspect of the right lower lobe. No consolidation or interlobular septal thickening. Upper Abdomen: Diffuse hepatic steatosis. Status post cholecystectomy. Images through the upper abdomen demonstrate no acute process. Soft Tissues/Bones: No acute bone or soft tissue abnormality. 1. No pulmonary embolism. 2. Small tree-in-bud opacities in the posterior aspect of the right lower lobe compatible with infectious bronchiolitis or scarring.        EKG (if obtained): (All EKG's are interpreted by myself in the absence of a cardiologist)    Rhythm: Sinus rhythm with sinus arrhythmia, LAD, pulmonary disease pattern  - Ventricular rate 92  - MI interval 148  - Axis: Left axis deviation  - ST changes: No STEMI  - No, Q-T prolongation, WPW, Q waves suggestive of HOCM, V-tach/V-fib, A-fib, A-flutter, SVT, torsardes or Brugada.   -Previous EKG  EKG interpreted by me       CRITICAL CARE NOTE:  There was a high probability of clinically significant life-threatening deterioration of the patient's condition requiring my urgent intervention due to shortness of breath. Oxygen, nebulizer treatment was performed to address this. Total critical care time is AT LEAST 35 minutes. This includes vital sign monitoring, pulse oximetry monitoring, telemetry monitoring, clinical response to the IV medications, reviewing the nursing notes, consultation time, dictation/documentation time, and interpretation of the lab work. This time excludes time spent performing procedures and separately billable procedures and family discussion time. MDM:    History is from patient    History limitation: None    29 y.o. male with a past medical history of hypertension, hyperlipidemia, diabetes, depression that presents to the ED with a history of flulike illness for the past 3 months. Patient endorses cough and congestion. Patient admits to history of smoking cigarettes as well as vaping. No history of chest pain, fever, lightheadedness, dizziness, orthopnea, nausea, diaphoresis, recent long distance travel or prolonged immobility. Patient physical examination is significant for shortness of breath, bilateral wheezing, with mild respiratory distress/labored breathing    Differentials include   Pneumonia, pleural effusion, PE, pulmonary edema, asthma, COPD, CHF, Pneumothorax, Tension pneumothorax,        EKG was done and is significant for normal sinus rhythm, LAD, pulmonary disease pattern. No STEMI.   EKG is interpreted by me in lieu of the cardiologist.     Laboratory evaluations include:  CBC, CMP, Troponin, VBG, viral studies. Other labs considered include: Lactic acid,    Radiology include:  CXR, CTA-chest     Significant Laboratory results include  -Unremarkable CBC, electrolytes, VBG, troponin and BNP    Significant Radiology includes  -Unremarkable chest x-ray.  - CT angiography of the chest is significant for no pulmonary embolism on dissection, but shows some right lung infiltrates. Patient received  -Nebulizer treatment as well as Solu-Medrol, magnesium as well as IV fluid.  -Discharged with Zithromax and albuterol inhaler. Consult:  -none    Disposition considered: discharge    Disposition:  -discharge home  Patient is given return instructions to the ED in the event of a persistence, worsening of symptoms or any other symptom of concern. Patient is given opportunity to ask questions and all questions are answered in appropriate details without the use of medical jargon  Patient verbalized his understanding and agreement with the plan      Social Determinants affecting management or disposition:none    Clinical Impression:  1. Shortness of breath    2. Cough    3. Post-COVID syndrome    4.  Viral URI with cough      Disposition referral (if applicable):  Pattie Castellano MD  [address] [place] [address]  [phone]    Schedule an appointment as soon as possible for a visit in 1 day      Gabe Mott MD  [address]  [phone]    Schedule an appointment as soon as possible for a visit in 1 day    Disposition medications (if applicable):  Discharge Medication List as of 1/9/2023 11:20 PM        START taking these medications    Details   azithromycin (ZITHROMAX) 250 MG tablet Take 1 tablet by mouth See Admin Instructions for 5 days 500mg on day 1 followed by 250mg on days 2 - 5, Disp-6 tablet, R-0Normal           ED Provider Disposition Time  DISPOSITION Decision To Discharge 01/09/2023 11:09:56 PM      Comment: Please note this report has been produced using speech recognition software and may contain errors related to that system including errors in grammar, punctuation, and spelling, as well as words and phrases that may be inappropriate.   Efforts were made to edit the dictations               [address] [place] [address],1[facility],   01/10/23 2071

## 2023-01-10 NOTE — ED NOTES
This nurse confirmed with ED physician albuterol dosage. Would like 2 albuterol(not 4) and 1 Atrovent med nebs @ this time. Physician to change order.      Priyanka Mandel RN  01/09/23 2295

## 2023-01-10 NOTE — DISCHARGE INSTRUCTIONS
Follow-up with primary care as soon as possible  Follow-up with pulmonology per referral  Take antibiotics as prescribed  Use inhaler as needed for shortness of breath  Return to the ED symptoms persist or worsen.

## 2023-01-10 NOTE — ED NOTES
Pt updated on POC. Call light in reach. Denies other needs @ this time.       Mich Schneider RN  01/09/23 2118

## 2023-01-12 ENCOUNTER — TELEPHONE (OUTPATIENT)
Dept: FAMILY MEDICINE CLINIC | Age: 33
End: 2023-01-12

## 2023-01-12 DIAGNOSIS — R09.02 HYPOXIA: Primary | ICD-10-CM

## 2023-01-12 NOTE — TELEPHONE ENCOUNTER
Patient was told by the ER Dr recommended that a   he needs an oxygen tank. Patient is wanting to know how to go about with   getting an oxygen machine. Please contact patient

## 2023-01-12 NOTE — TELEPHONE ENCOUNTER
----- Message from Norma Gallo sent at 1/12/2023  3:39 PM EST -----  Subject: Message to Provider    QUESTIONS  Information for Provider? Patient was told by the ER Dr lea that a   he needs an oxygen tank. Patient is wanting to know how to go about with   getting an oxygen machine. Please contact patient.   ---------------------------------------------------------------------------  --------------  Cesilia Tan [facility]  [phone]; OK to leave message on voicemail  ---------------------------------------------------------------------------  --------------  SCRIPT ANSWERS  Relationship to Patient?  Self

## 2023-01-20 ENCOUNTER — TELEPHONE (OUTPATIENT)
Dept: FAMILY MEDICINE CLINIC | Age: 33
End: 2023-01-20

## 2023-02-08 ENCOUNTER — OFFICE VISIT (OUTPATIENT)
Dept: PULMONOLOGY | Age: 33
End: 2023-02-08

## 2023-02-08 VITALS
HEART RATE: 101 BPM | RESPIRATION RATE: 16 BRPM | HEIGHT: 74 IN | OXYGEN SATURATION: 96 % | BODY MASS INDEX: 40.43 KG/M2 | WEIGHT: 315 LBS

## 2023-02-08 DIAGNOSIS — R05.1 ACUTE COUGH: ICD-10-CM

## 2023-02-08 DIAGNOSIS — G47.33 OSA (OBSTRUCTIVE SLEEP APNEA): ICD-10-CM

## 2023-02-08 DIAGNOSIS — Z72.0 TOBACCO ABUSE: ICD-10-CM

## 2023-02-08 DIAGNOSIS — R06.83 SNORES: ICD-10-CM

## 2023-02-08 DIAGNOSIS — J40 BRONCHITIS: Primary | ICD-10-CM

## 2023-02-08 DIAGNOSIS — J44.9 ASTHMA WITH COPD (CHRONIC OBSTRUCTIVE PULMONARY DISEASE) (HCC): ICD-10-CM

## 2023-02-08 PROBLEM — J44.89 ASTHMA WITH COPD (CHRONIC OBSTRUCTIVE PULMONARY DISEASE): Status: ACTIVE | Noted: 2023-02-08

## 2023-02-08 RX ORDER — ALBUTEROL SULFATE 2.5 MG/3ML
2.5 SOLUTION RESPIRATORY (INHALATION) EVERY 6 HOURS PRN
COMMUNITY
End: 2023-02-08 | Stop reason: SDUPTHER

## 2023-02-08 RX ORDER — ALBUTEROL SULFATE 2.5 MG/3ML
2.5 SOLUTION RESPIRATORY (INHALATION) EVERY 4 HOURS PRN
Qty: 120 EACH | Refills: 5 | Status: SHIPPED | OUTPATIENT
Start: 2023-02-08

## 2023-02-08 ASSESSMENT — ENCOUNTER SYMPTOMS
ALLERGIC/IMMUNOLOGIC NEGATIVE: 1
GASTROINTESTINAL NEGATIVE: 1
COUGH: 1
BACK PAIN: 1
SHORTNESS OF BREATH: 1

## 2023-02-08 ASSESSMENT — SLEEP AND FATIGUE QUESTIONNAIRES
NECK CIRCUMFERENCE (INCHES): 20
ESS TOTAL SCORE: 11
HOW LIKELY ARE YOU TO NOD OFF OR FALL ASLEEP WHILE WATCHING TV: 2
HOW LIKELY ARE YOU TO NOD OFF OR FALL ASLEEP WHILE SITTING AND TALKING TO SOMEONE: 1
HOW LIKELY ARE YOU TO NOD OFF OR FALL ASLEEP IN A CAR, WHILE STOPPED FOR A FEW MINUTES IN TRAFFIC: 0
HOW LIKELY ARE YOU TO NOD OFF OR FALL ASLEEP WHILE SITTING QUIETLY AFTER LUNCH WITHOUT ALCOHOL: 1
HOW LIKELY ARE YOU TO NOD OFF OR FALL ASLEEP WHILE LYING DOWN TO REST IN THE AFTERNOON WHEN CIRCUMSTANCES PERMIT: 2
HOW LIKELY ARE YOU TO NOD OFF OR FALL ASLEEP WHILE SITTING INACTIVE IN A PUBLIC PLACE: 2
HOW LIKELY ARE YOU TO NOD OFF OR FALL ASLEEP WHEN YOU ARE A PASSENGER IN A CAR FOR AN HOUR WITHOUT A BREAK: 1
HOW LIKELY ARE YOU TO NOD OFF OR FALL ASLEEP WHILE SITTING AND READING: 2

## 2023-02-08 NOTE — PROGRESS NOTES
Romulo Mcmullen (:  1990) is a 29 y.o. male,New patient, here for evaluation of the following chief complaint(s):  New Patient        Subjective   SUBJECTIVE/OBJECTIVE:  Loretta Ch 28 yo male is here in pulmonary clinic to establish management for bronchiolitis. He has a current health status with cardiac, diabetes and depression. He takes diltiazem and metoprolol for treatment of BP and is insulin dependent. He presents stable with mild labored breathing at rest. HR is 101. He states that this his normal HR, but that it increases when he starts walking. Saturation oxygenation is 92% while sitting at rest. He states that bronchiolitis was found on CT. He wanted to know why a CT was done. We discussed purpose of performing a CTA. Chest xray found no acute cardiopulmonary process. He reports that he began having symptoms of shortness of breath and coughing that lasted for 3 weeks and it became so bad that he went to ED. That's when they put dye in his arteries. He is noticing that it is becoming more harder to walk without getting short of breath. He cannot play with his children who are four and nine, he cannot run. He states that half of his inability to play with his young children is because he suffers from extreme back pain. The other is because of his breathing. He is a current smoker. He states that he needs help with quitting smoking. He has tried many things to quit like use of nicotine patches that do not work for him. He chewed gum and wonders if taking Chantex would help. We discussed quitting cold turkey. We discussed that wearing the nicotine patches while still being committed to smoking can increase his risk for stroke, heart attack leading to death. He is taking medication for BP, smoking works against his blood pressure medication. I discussed with Tess Solders that although quitting is difficult, he has to first, be ready to quit.  I could not stress enough importance of quitting smoking. I discussed results of the CTA and Xray. Breath sounds are wheezes throughout all lungs fields with decreased flows. He uses an Albuterol inhaler and states that he has been using it 2 or more times daily, but that he feels like it is not helping. Provided Mar Brandon with a trial sample of Trelegy 200 mcg with instructions to take one puff daily in the morning. Rinse mouth after use to reduce change of thrush because of the steroid component. He should continue to monitor frequency of needing to use the Albuterol inhaler. He states that he uses a nebulizer but has ran out of the Albuterol nebulizer solution. I sent an E-script to pharmacy. The CTA did confirm 1. No pulmonary embolism. Small tree-in-bud opacities in the posterior aspect of the right lower  lobe compatible with infectious bronchiolitis or scarring. Mar Brandon reports that his wife has witnessed him snoring. He does not know if he stops breathing. He reports waking up feeling tired and sleepy. He states that he can lie down now and go to sleep. He feels that he is in need of oxygen. I did a walk test in office, in which each time, he shorted the distance. Breathing became more labored with clear signs of shortness of breath. Sat02 dropped from 96% to 93% and was able to recover at 95% with rest.  I ordered a home sleep study and home oxygen test to further evaluate drop in oxygenation and sleep apnea. I would prefer he complete the sleep study in sleep lab under the watchful eyes of sleep therapists. I am doing a home sleep test because Mar Brandon states that he cannot sleep among strangers. He has to take melatonin and trazodone to help him sleep. I feel that there  may be too many distracters in his home. But he is insistent on completing the study at home. Mar Brandon has completed both doses of the [employer] vaccine for [employer]. He has not received any boosters and does not intend on doing so. His last flu vaccine was in 2020.  He has not received a pneumonia vaccine. He is due for his next Tdap injection 02/10/203. His prior Tdap was completed on 02/10/2013. Review of Systems   Constitutional:  Positive for fatigue. HENT: Negative. Respiratory:  Positive for cough and shortness of breath. Cardiovascular: Negative. Gastrointestinal: Negative. Endocrine: Negative. Genitourinary: Negative. Musculoskeletal:  Positive for back pain. Skin: Negative. Allergic/Immunologic: Negative. Neurological: Negative. Hematological: Negative. Psychiatric/Behavioral:  Positive for sleep disturbance. Snoring witnessed by spouse. Objective   Physical Exam  Vitals and nursing note reviewed. Constitutional:       Appearance: Normal appearance. HENT:      Nose: Nose normal.      Mouth/Throat:      Mouth: Mucous membranes are moist.      Pharynx: Oropharynx is clear. Eyes:      Extraocular Movements: Extraocular movements intact. Pupils: Pupils are equal, round, and reactive to light. Cardiovascular:      Rate and Rhythm: Tachycardia present. Pulses: Normal pulses. Heart sounds: Normal heart sounds. Comments: Heart rate increases during ambulation from sitting at 92 bpm to walking at199 bpm.   /72. Patient reports taking BP medication. Heart rate returns to 100 with rest.   Pulmonary:      Effort: Pulmonary effort is normal.      Breath sounds: Decreased air movement present. Examination of the right-upper field reveals decreased breath sounds and wheezing. Examination of the left-upper field reveals decreased breath sounds and wheezing. Examination of the right-middle field reveals decreased breath sounds and wheezing. Examination of the right-lower field reveals decreased breath sounds and wheezing. Examination of the left-lower field reveals decreased breath sounds and wheezing. Decreased breath sounds and wheezing present. Comments: Labored breathing with ambulation. Abdominal:      Palpations: Abdomen is soft. Musculoskeletal:      Cervical back: Normal range of motion and neck supple. Neurological:      General: No focal deficit present. Mental Status: He is alert. Psychiatric:         Mood and Affect: Mood normal.         Behavior: Behavior normal.         Thought Content: Thought content normal.         Judgment: Judgment normal.         ASSESSMENT/PLAN:  1. Bronchitis  -     Full PFT Study With Bronchodilator; Future  -     Home O2 eval (desaturation screen); Future  -     6 Minute Walk Test; Future  2. Acute cough  3. Tobacco abuse  4. Snores  5. IAN (obstructive sleep apnea)  -     Home Sleep Study; Future  -     Home O2 eval (desaturation screen); Future  6. Asthma with COPD (chronic obstructive pulmonary disease) ([facility] [address]    Recommendations  --Discussed smoking cessation  --Provided trial sample of Trelegy 200 mcg with instructions to take one puff daily in the morning for 14 day. Rinse mouth out after use due to steroid component. --Brock Mandes on proper administration of taking Trelegy during visit. He took one puff of the Trelegy and rinsed his mouth. He now has 13 days left to use. He agrees to follow up with me via MyCchart or phone call in seven days to inform how the medication is working for him. Coupon provided. --continue using Albuterol rescue inhaler every 4 to 6 hours PRN  --Continue using Albuterol 2.5 mg nebulize solution every 4 to 6 hours for sob PRN   --Continue to monitor frequency of needing to use Albuterol products. --Recommended receiving yearly vaccines and boosters to reduce transmission of illness and sickness from respiratory viruses and infections. Michelle Persaud acknowledged information be declined. Vital Signs   Pulse (!) 101   Resp 16   Ht 6' 2\" (1.88 m)   Wt (!) 315 lb (142.9 kg)   SpO2 96%   BMI 40.44 kg/m²      No follow-ups on file.        Sleep Medicine 2/8/2023   Sitting and reading 2   Watching TV 2   Sitting, inactive in a public place (e.g. a theatre or a meeting) 2   As a passenger in a car for an hour without a break 1   Lying down to rest in the afternoon when circumstances permit 2   Sitting and talking to someone 1   Sitting quietly after a lunch without alcohol 1   In a car, while stopped for a few minutes in traffic 0   [place] Sleepiness Score 11   Neck circumference (Inches) 20     Mallampati score 4         Tobacco Use      Smoking status: Every Day        Packs/day: 0.25        Years: 9.00        Pack years: 2.25        Types: E-Cigarettes, Cigarettes      Smokeless tobacco: Never      Tobacco comments: smokes vape     Active Ambulatory Problems     Diagnosis Date Noted    Essential hypertension 05/31/2016    Obesity, morbid, BMI 40.0-49.9 ([facility] 05/31/2016    Anxiety and depression 05/31/2016    Hypertriglyceridemia 04/25/2017    CCC (chronic calculous cholecystitis)     Chronic midline low back pain with sciatica 04/03/2019    Gastroesophageal reflux disease without esophagitis 04/03/2019    Bulging lumbar disc 05/03/2020    Type 2 diabetes mellitus without complication, without long-term current use of insulin ([facility] [address] 02/06/2021    Obesity (BMI 35.0-39.9 without comorbidity) 02/06/2021    Chronic pain of both knees 07/19/2021    Dermatitis 07/19/2021    Weakness 09/07/2021    Bronchitis 10/17/2022    Acute cough 10/17/2022    Pain in both hands 10/17/2022    Tobacco abuse 02/08/2023    Snores 02/08/2023    IAN (obstructive sleep apnea) 02/08/2023    Asthma with COPD (chronic obstructive pulmonary disease) ([facility] 02/08/2023     Resolved Ambulatory Problems     Diagnosis Date Noted    No Resolved Ambulatory Problems     Past Medical History:   Diagnosis Date    Depression 5/31/2016    New onset type 2 diabetes mellitus ([address] 2/6/2021         Current Outpatient Medications   Medication Sig Dispense Refill    albuterol (PROVENTIL) (2.5 MG/3ML) 0.083% nebulizer solution Take 3 mLs by nebulization every 4 hours as needed for Shortness of Breath or Wheezing 120 each 5    albuterol sulfate HFA (PROVENTIL;VENTOLIN;PROAIR) 108 (90 Base) MCG/ACT inhaler Inhale 2 puffs into the lungs 4 times daily as needed for Wheezing 18 g 5    Respiratory Therapy Supplies (NEBULIZER) WILLIAN Use the machine every 4 hrs as needed for wheezing or SOB 1 each 0    metoprolol succinate (TOPROL XL) 50 MG extended release tablet Take 1 tablet by mouth daily REFILL ONE TIME UNTIL SEES DR 90 tablet 0    lisinopril (PRINIVIL;ZESTRIL) 20 MG tablet Take 1 tablet by mouth daily REFILLED 1 TIME UNTIL SEES DR. 90 tablet 0    cloNIDine (CATAPRES) 0.1 MG tablet TAKE ONE TABLET BY MOUTH TWICE DAILY 180 tablet 0    dilTIAZem (CARDIZEM CD) 120 MG extended release capsule Take 1 capsule by mouth daily 90 capsule 0    traZODone (DESYREL) 50 MG tablet Take 1 tablet by mouth nightly as needed for Sleep 30 tablet 5    Insulin Pen Needle 29G X 12MM MISC 1 each by Does not apply route daily 100 each 3    Dulaglutide 1.5 MG/0.5ML SOPN Inject 1.5 mg into the skin once a week 12 Adjustable Dose Pre-filled Pen Syringe 3    sertraline (ZOLOFT) 50 MG tablet TAKE 1 TABLET BY MOUTH EVERY DAY 90 tablet 3    DULoxetine (CYMBALTA) 30 MG extended release capsule Take 1 capsule by mouth daily 90 capsule 3    pantoprazole (PROTONIX) 40 MG tablet TAKE 1 TABLET BY MOUTH TWO TIMES A  tablet 3    glimepiride (AMARYL) 1 MG tablet TAKE 1 TABLET BY MOUTH TWO TIMES A  tablet 3    topiramate (TOPAMAX) 50 MG tablet TAKE 1 TABLET BY MOUTH TWO TIMES DAILY 180 tablet 3    insulin glargine (LANTUS SOLOSTAR) 100 UNIT/ML injection pen Inject 80 Units into the skin nightly 15 mL 5    ibuprofen (ADVIL;MOTRIN) 800 MG tablet Take 1 tablet by mouth 2 times daily as needed for Pain 60 tablet 5    insulin lispro, 1 Unit Dial, 100 UNIT/ML SOPN INJECT 5 UNITS INTO THE SKIN 3 TIMES DAILY BEFORE MEALS 15 mL 0    Continuous Blood Gluc  (DEXCOM G6 ) WILLIAN Check BS 4 times daily 1 Device 0 Continuous Blood Gluc Sensor (DEXCOM G6 SENSOR) MISC Check BS 4-5 times daily 1 each 5    blood glucose monitor kit and supplies Use 3-4 times daily 1 kit 0    blood glucose monitor strips Test 3-4 times a day & as needed for symptoms of irregular blood glucose. 100 strip 5    Glucosource Lancets MISC Use one 3-4 times to check blood sugar 100 each 5     No current facility-administered medications for this visit. Lorenzakim Jacob expressed understanding of the information we discussed. He is in agreement with the treatment plan. An electronic signature was used to authenticate this note.     --Linnea Sultana, APRN - CNP

## 2023-02-21 ENCOUNTER — TELEPHONE (OUTPATIENT)
Dept: FAMILY MEDICINE CLINIC | Age: 33
End: 2023-02-21

## 2023-02-21 NOTE — TELEPHONE ENCOUNTER
Pt called. He left a message on My Chart regarding his rash all over his body. Its itchy and painful. His main request was he is out of his Norco and requested a refill.  Please advise

## 2023-02-28 RX ORDER — MONTELUKAST SODIUM 10 MG/1
10 TABLET ORAL NIGHTLY
Qty: 90 TABLET | Refills: 1 | Status: SHIPPED | OUTPATIENT
Start: 2023-02-28

## 2023-05-11 ENCOUNTER — HOSPITAL ENCOUNTER (EMERGENCY)
Age: 33
Discharge: HOME | End: 2023-05-11
Attending: EMERGENCY MEDICINE
Payer: COMMERCIAL

## 2023-05-11 VITALS
HEART RATE: 99 BPM | OXYGEN SATURATION: 96 % | TEMPERATURE: 97.8 F | RESPIRATION RATE: 17 BRPM | SYSTOLIC BLOOD PRESSURE: 160 MMHG | BODY MASS INDEX: 40.44 KG/M2 | WEIGHT: 315 LBS | DIASTOLIC BLOOD PRESSURE: 100 MMHG

## 2023-05-11 DIAGNOSIS — K04.7 DENTAL INFECTION: ICD-10-CM

## 2023-05-11 DIAGNOSIS — K02.9 DENTAL CARIES: ICD-10-CM

## 2023-05-11 DIAGNOSIS — K08.89 PAIN, DENTAL: Primary | ICD-10-CM

## 2023-05-11 PROCEDURE — 6370000000 HC RX 637 (ALT 250 FOR IP): Performed by: EMERGENCY MEDICINE

## 2023-05-11 PROCEDURE — 99283 EMERGENCY DEPT VISIT LOW MDM: CPT

## 2023-05-11 RX ORDER — NAPROXEN 500 MG/1
500 TABLET ORAL 2 TIMES DAILY PRN
Qty: 20 TABLET | Refills: 0 | Status: SHIPPED | OUTPATIENT
Start: 2023-05-11 | End: 2023-05-21

## 2023-05-11 RX ORDER — PENICILLIN V POTASSIUM 500 MG/1
500 TABLET ORAL 3 TIMES DAILY
Qty: 30 TABLET | Refills: 0 | Status: SHIPPED | OUTPATIENT
Start: 2023-05-11 | End: 2023-05-21

## 2023-05-11 RX ORDER — PENICILLIN V POTASSIUM 500 MG/1
500 TABLET ORAL ONCE
Status: COMPLETED | OUTPATIENT
Start: 2023-05-11 | End: 2023-05-11

## 2023-05-11 RX ORDER — HYDROCODONE BITARTRATE AND ACETAMINOPHEN 5; 325 MG/1; MG/1
1 TABLET ORAL ONCE
Status: COMPLETED | OUTPATIENT
Start: 2023-05-11 | End: 2023-05-11

## 2023-05-11 RX ADMIN — HYDROCODONE BITARTRATE AND ACETAMINOPHEN 1 TABLET: 5; 325 TABLET ORAL at 00:20

## 2023-05-11 RX ADMIN — PENICILLIN V POTASSIUM 500 MG: 500 TABLET, FILM COATED ORAL at 00:20

## 2023-05-11 ASSESSMENT — ENCOUNTER SYMPTOMS
TRISMUS: 0
RESPIRATORY NEGATIVE: 1
GASTROINTESTINAL NEGATIVE: 1
FACIAL SWELLING: 1
EYES NEGATIVE: 1

## 2023-05-11 ASSESSMENT — PAIN - FUNCTIONAL ASSESSMENT: PAIN_FUNCTIONAL_ASSESSMENT: ACTIVITIES ARE NOT PREVENTED

## 2023-05-11 ASSESSMENT — PAIN SCALES - GENERAL: PAINLEVEL_OUTOF10: 10

## 2023-05-11 ASSESSMENT — PAIN DESCRIPTION - LOCATION: LOCATION: TEETH

## 2023-05-11 ASSESSMENT — LIFESTYLE VARIABLES
HOW MANY STANDARD DRINKS CONTAINING ALCOHOL DO YOU HAVE ON A TYPICAL DAY: PATIENT DOES NOT DRINK
HOW OFTEN DO YOU HAVE A DRINK CONTAINING ALCOHOL: NEVER

## 2023-05-11 ASSESSMENT — PAIN DESCRIPTION - ORIENTATION: ORIENTATION: LEFT;UPPER

## 2023-05-11 ASSESSMENT — PAIN DESCRIPTION - DESCRIPTORS: DESCRIPTORS: THROBBING;SHARP

## 2023-05-11 NOTE — DISCHARGE INSTRUCTIONS
You need to see a dentist for definitve management, many of these teeth will need to be pulled. These chronic infections will also affect your diabetes.  You

## 2023-05-11 NOTE — ED PROVIDER NOTES
Dental Pain  Location:  Upper and lower (upper back and lower left back teeth are rotten and in need of root canals.)  Quality:  Pulsating and throbbing  Severity:  Severe  Onset quality: Chronic issue but worsening. Progression:  Worsening  Chronicity:  Chronic  Context comment:  Patient refuses to follow up because he does not like dentists  Relieved by:  Nothing  Worsened by:  Cold food/drink, pressure and touching  Ineffective treatments:  NSAIDs and acetaminophen  Associated symptoms: facial pain, facial swelling and gum swelling    Associated symptoms: no congestion, no difficulty swallowing, no drooling, no fever, no headaches, no neck pain, no neck swelling, no oral bleeding, no oral lesions and no trismus      Review of Systems   Constitutional: Negative. Negative for fever. HENT:  Positive for facial swelling. Negative for congestion, drooling and mouth sores. Eyes: Negative. Respiratory: Negative. Cardiovascular: Negative. Gastrointestinal: Negative. Genitourinary: Negative. Musculoskeletal: Negative. Negative for neck pain. Skin: Negative. Neurological: Negative. Negative for headaches. All other systems reviewed and are negative.     Family History   Problem Relation Age of Onset    Diabetes Maternal Uncle     Stroke Maternal Uncle     Cancer Maternal Grandmother     Heart Disease Maternal Grandmother     Stroke Maternal Grandmother     Stroke Maternal Grandfather      Social History     Socioeconomic History    Marital status:      Spouse name: Not on file    Number of children: Not on file    Years of education: Not on file    Highest education level: Not on file   Occupational History    Not on file   Tobacco Use    Smoking status: Every Day     Packs/day: 0.25     Years: 9.00     Pack years: 2.25     Types: E-Cigarettes, Cigarettes    Smokeless tobacco: Never    Tobacco comments:     smokes vape   Vaping Use    Vaping Use: Every day    Substances: Always

## 2023-09-19 ENCOUNTER — OFFICE VISIT (OUTPATIENT)
Dept: PULMONOLOGY | Age: 33
End: 2023-09-19
Payer: COMMERCIAL

## 2023-09-19 VITALS
HEIGHT: 74 IN | OXYGEN SATURATION: 99 % | HEART RATE: 91 BPM | WEIGHT: 313.38 LBS | DIASTOLIC BLOOD PRESSURE: 70 MMHG | BODY MASS INDEX: 40.22 KG/M2 | SYSTOLIC BLOOD PRESSURE: 124 MMHG

## 2023-09-19 DIAGNOSIS — R40.20 LOC (LOSS OF CONSCIOUSNESS) (HCC): ICD-10-CM

## 2023-09-19 DIAGNOSIS — J44.9 ASTHMA WITH COPD (CHRONIC OBSTRUCTIVE PULMONARY DISEASE) (HCC): ICD-10-CM

## 2023-09-19 DIAGNOSIS — G47.33 OSA (OBSTRUCTIVE SLEEP APNEA): ICD-10-CM

## 2023-09-19 DIAGNOSIS — E66.01 OBESITY, CLASS III, BMI 40-49.9 (MORBID OBESITY) (HCC): ICD-10-CM

## 2023-09-19 DIAGNOSIS — R94.31 ABNORMAL FINDING ON EKG: Primary | ICD-10-CM

## 2023-09-19 PROCEDURE — 99214 OFFICE O/P EST MOD 30 MIN: CPT | Performed by: NURSE PRACTITIONER

## 2023-09-19 PROCEDURE — 4004F PT TOBACCO SCREEN RCVD TLK: CPT | Performed by: NURSE PRACTITIONER

## 2023-09-19 PROCEDURE — G8427 DOCREV CUR MEDS BY ELIG CLIN: HCPCS | Performed by: NURSE PRACTITIONER

## 2023-09-19 PROCEDURE — G8417 CALC BMI ABV UP PARAM F/U: HCPCS | Performed by: NURSE PRACTITIONER

## 2023-09-19 PROCEDURE — 3074F SYST BP LT 130 MM HG: CPT | Performed by: NURSE PRACTITIONER

## 2023-09-19 PROCEDURE — 3023F SPIROM DOC REV: CPT | Performed by: NURSE PRACTITIONER

## 2023-09-19 PROCEDURE — 3078F DIAST BP <80 MM HG: CPT | Performed by: NURSE PRACTITIONER

## 2023-09-19 RX ORDER — DEXTROMETHORPHAN HYDROBROMIDE AND GUAIFENESIN 20; 400 MG/20ML; MG/20ML
5 SOLUTION ORAL 2 TIMES DAILY
Qty: 840 ML | Refills: 0 | Status: SHIPPED | OUTPATIENT
Start: 2023-09-19

## 2023-09-19 ASSESSMENT — COPD QUESTIONNAIRES: COPD: 1

## 2023-09-19 NOTE — PROGRESS NOTES
mornings. I will try to hold off on not smoking. \" He is not agreeable to having the sleep test.           An electronic signature was used to authenticate this note.     --Kenna Jacob, APRN - CNP

## 2024-05-20 ENCOUNTER — OFFICE VISIT (OUTPATIENT)
Dept: NEUROLOGY | Age: 34
End: 2024-05-20
Payer: COMMERCIAL

## 2024-05-20 VITALS
BODY MASS INDEX: 38.5 KG/M2 | HEART RATE: 82 BPM | OXYGEN SATURATION: 97 % | DIASTOLIC BLOOD PRESSURE: 72 MMHG | SYSTOLIC BLOOD PRESSURE: 122 MMHG | HEIGHT: 74 IN | WEIGHT: 300 LBS

## 2024-05-20 DIAGNOSIS — G43.719 INTRACTABLE CHRONIC MIGRAINE WITHOUT AURA AND WITHOUT STATUS MIGRAINOSUS: Primary | ICD-10-CM

## 2024-05-20 DIAGNOSIS — G47.33 OSA (OBSTRUCTIVE SLEEP APNEA): ICD-10-CM

## 2024-05-20 PROCEDURE — 4004F PT TOBACCO SCREEN RCVD TLK: CPT | Performed by: STUDENT IN AN ORGANIZED HEALTH CARE EDUCATION/TRAINING PROGRAM

## 2024-05-20 PROCEDURE — 3078F DIAST BP <80 MM HG: CPT | Performed by: STUDENT IN AN ORGANIZED HEALTH CARE EDUCATION/TRAINING PROGRAM

## 2024-05-20 PROCEDURE — G8427 DOCREV CUR MEDS BY ELIG CLIN: HCPCS | Performed by: STUDENT IN AN ORGANIZED HEALTH CARE EDUCATION/TRAINING PROGRAM

## 2024-05-20 PROCEDURE — 3074F SYST BP LT 130 MM HG: CPT | Performed by: STUDENT IN AN ORGANIZED HEALTH CARE EDUCATION/TRAINING PROGRAM

## 2024-05-20 PROCEDURE — G8417 CALC BMI ABV UP PARAM F/U: HCPCS | Performed by: STUDENT IN AN ORGANIZED HEALTH CARE EDUCATION/TRAINING PROGRAM

## 2024-05-20 PROCEDURE — 99205 OFFICE O/P NEW HI 60 MIN: CPT | Performed by: STUDENT IN AN ORGANIZED HEALTH CARE EDUCATION/TRAINING PROGRAM

## 2024-05-20 RX ORDER — RIMEGEPANT SULFATE 75 MG/75MG
TABLET, ORALLY DISINTEGRATING ORAL
Qty: 4 TABLET | Refills: 0 | Status: SHIPPED | COMMUNITY
Start: 2024-05-20

## 2024-05-20 RX ORDER — RIMEGEPANT SULFATE 75 MG/75MG
75 TABLET, ORALLY DISINTEGRATING ORAL PRN
Qty: 8 TABLET | Refills: 2 | Status: SHIPPED | OUTPATIENT
Start: 2024-05-20

## 2024-05-20 NOTE — PROGRESS NOTES
Consult Note  [facility]  Patient Name: Rustam Coreas  : 1990        Subjective:   Reason for consult:   Patient seen and examined. Chart reviewed in detail.    34 y.o. -male with PMH COPD, DM, HTN, HLD presenting to [facility] for headaches.       These have been ongoing for the past 7 years.   The headache is unilateral, described as throbbing in nature. There is associated photo/phonophobia. No nausea . These always last >4 hours and typically last 4-12 hours.     He does not describe aura with their migraine. Discussed increased risk of stroke with migraine aura and decrease subsequently when on appropriate preventative migraine regimen.     Migraine History:  Triggers:    Baseline headache frequency: 28-30/30 days per month  Baseline migraine frequency: 15/30 days per month    Prior Preventative medications tried: topamax (caused pancreatitis), metoprolol, sertraline, cymbalta, gabapentin  Prior abortive medications tried: tylenol, ibuprofen    He says he has a known blockage in his heart, and has HTN.     Poor sleep x decades. Unsure if he snores           2023    11:44 AM   Sleep Medicine   Sitting and reading 2   Watching TV 2   Sitting, inactive in a public place (e.g. a theatre or a meeting) 2   As a passenger in a car for an hour without a break 1   Lying down to rest in the afternoon when circumstances permit 2   Sitting and talking to someone 1   Sitting quietly after a lunch without alcohol 1   In a car, while stopped for a few minutes in traffic 0   [place] Sleepiness Score 11   Neck circumference (Inches) 20        Past Medical History:   Diagnosis Date    Asthma with COPD (chronic obstructive pulmonary disease) (HCC) 2023    Depression 2016    Essential hypertension 2016    Hypertriglyceridemia 2017    New onset type 2 diabetes mellitus (HCC) 2021    :   Past Surgical History:   Procedure Laterality Date    CHOLECYSTECTOMY,

## 2024-06-18 ENCOUNTER — HOSPITAL ENCOUNTER (OUTPATIENT)
Dept: SLEEP CENTER | Age: 34
Discharge: HOME | End: 2024-06-18
Payer: COMMERCIAL

## 2024-06-18 VITALS
OXYGEN SATURATION: 94 % | BODY MASS INDEX: 38.5 KG/M2 | DIASTOLIC BLOOD PRESSURE: 79 MMHG | SYSTOLIC BLOOD PRESSURE: 138 MMHG | WEIGHT: 300 LBS | HEIGHT: 74 IN | HEART RATE: 77 BPM

## 2024-06-18 DIAGNOSIS — G47.33 OSA (OBSTRUCTIVE SLEEP APNEA): Primary | ICD-10-CM

## 2024-06-18 DIAGNOSIS — J44.89 ASTHMA WITH COPD (CHRONIC OBSTRUCTIVE PULMONARY DISEASE) (HCC): ICD-10-CM

## 2024-06-18 DIAGNOSIS — I10 ESSENTIAL HYPERTENSION: ICD-10-CM

## 2024-06-18 DIAGNOSIS — Z72.0 TOBACCO ABUSE: ICD-10-CM

## 2024-06-18 DIAGNOSIS — F51.01 PRIMARY INSOMNIA: ICD-10-CM

## 2024-06-18 PROCEDURE — 99205 OFFICE O/P NEW HI 60 MIN: CPT | Performed by: PSYCHIATRY & NEUROLOGY

## 2024-06-18 PROCEDURE — 99211 OFF/OP EST MAY X REQ PHY/QHP: CPT

## 2024-06-18 RX ORDER — MIRTAZAPINE 15 MG/1
15 TABLET, FILM COATED ORAL NIGHTLY
COMMUNITY

## 2024-06-18 ASSESSMENT — SLEEP AND FATIGUE QUESTIONNAIRES
HOW LIKELY ARE YOU TO NOD OFF OR FALL ASLEEP WHILE SITTING AND TALKING TO SOMEONE: WOULD NEVER DOZE
HOW LIKELY ARE YOU TO NOD OFF OR FALL ASLEEP WHILE SITTING AND READING: SLIGHT CHANCE OF DOZING
HOW LIKELY ARE YOU TO NOD OFF OR FALL ASLEEP WHILE LYING DOWN TO REST IN THE AFTERNOON WHEN CIRCUMSTANCES PERMIT: MODERATE CHANCE OF DOZING
HOW LIKELY ARE YOU TO NOD OFF OR FALL ASLEEP IN A CAR, WHILE STOPPED FOR A FEW MINUTES IN TRAFFIC: WOULD NEVER DOZE
HOW LIKELY ARE YOU TO NOD OFF OR FALL ASLEEP WHEN YOU ARE A PASSENGER IN A CAR FOR AN HOUR WITHOUT A BREAK: WOULD NEVER DOZE
ESS TOTAL SCORE: 3
HOW LIKELY ARE YOU TO NOD OFF OR FALL ASLEEP WHILE WATCHING TV: WOULD NEVER DOZE
HOW LIKELY ARE YOU TO NOD OFF OR FALL ASLEEP WHILE SITTING INACTIVE IN A PUBLIC PLACE: WOULD NEVER DOZE
HOW LIKELY ARE YOU TO NOD OFF OR FALL ASLEEP WHILE SITTING QUIETLY AFTER LUNCH WITHOUT ALCOHOL: WOULD NEVER DOZE
NECK CIRCUMFERENCE (INCHES): 18.5

## 2024-06-18 NOTE — PROGRESS NOTES
[facility]      Jaiden Braun MD, FACP, [facility]  Florencio Worley MD, [facility]  Leonard Munoz MD, [facility]  Nesha Barrientos DO      [address]  [address]  [place] [address]   PH: [phone]  F: [phone]     Subjective:     Patient ID: Rustam Coreas is a 34 y.o. male, referred to the sleep center possible IAN.    Chief Complaint   Patient presents with    G47.33   .    Referring Provider:    Dena Schneider DO  [address]  [address]  [place] [address]    Summary: Quinton has chronic migraines and has been having daily headaches for years. A sleep study has been recommended. He knows he has insomnia. Has trouble falling asleep. Once asleep he can stay asleep. Goes to bed at 10pm-4 am, takes an hour to fall asleep. Gets up when he wakes. Doesn't have a schedule. Normally out of bed by noon.     Symptoms:   [x]  Snoring      [x]  Dry Mouth  []  Choking                                                                [x]  Morning Headaches  []  Gasping for Air                                                     [x]  Trouble Falling asleep  [x]  Tired during the daytime                                      []  Trouble Staying Asleep  [x]  Tired when you wake up                                      []  Weight Gain in Last 5 Years  []  Wake up frequently at night                                 [x]  Weight Loss in Last 5 Years down 15 pounds  []  Shortness Of Breath                                           []  Shift Worker  []  Coughing                                                             [x]  Smoker (Previous or Current)  []  Chest Pain                                                         [x]  Anxiety  [x]  Trouble keeping your legs still at night               [x]  Depression  [x]  Kicking your legs in your sleep                           [x]  Insomnia            []  Other:

## 2024-07-03 ENCOUNTER — OFFICE VISIT (OUTPATIENT)
Dept: NEUROLOGY | Age: 34
End: 2024-07-03
Payer: COMMERCIAL

## 2024-07-03 ENCOUNTER — TELEPHONE (OUTPATIENT)
Dept: NEUROLOGY | Age: 34
End: 2024-07-03

## 2024-07-03 VITALS
HEART RATE: 83 BPM | OXYGEN SATURATION: 95 % | SYSTOLIC BLOOD PRESSURE: 128 MMHG | DIASTOLIC BLOOD PRESSURE: 82 MMHG | WEIGHT: 299.4 LBS | BODY MASS INDEX: 38.44 KG/M2

## 2024-07-03 DIAGNOSIS — G43.719 INTRACTABLE CHRONIC MIGRAINE WITHOUT AURA AND WITHOUT STATUS MIGRAINOSUS: Primary | ICD-10-CM

## 2024-07-03 DIAGNOSIS — E66.01 SEVERE OBESITY (BMI 35.0-39.9) WITH COMORBIDITY (HCC): ICD-10-CM

## 2024-07-03 PROCEDURE — 3079F DIAST BP 80-89 MM HG: CPT | Performed by: NURSE PRACTITIONER

## 2024-07-03 PROCEDURE — 3074F SYST BP LT 130 MM HG: CPT | Performed by: NURSE PRACTITIONER

## 2024-07-03 PROCEDURE — 4004F PT TOBACCO SCREEN RCVD TLK: CPT | Performed by: NURSE PRACTITIONER

## 2024-07-03 PROCEDURE — G8417 CALC BMI ABV UP PARAM F/U: HCPCS | Performed by: NURSE PRACTITIONER

## 2024-07-03 PROCEDURE — 99213 OFFICE O/P EST LOW 20 MIN: CPT | Performed by: NURSE PRACTITIONER

## 2024-07-03 PROCEDURE — G8427 DOCREV CUR MEDS BY ELIG CLIN: HCPCS | Performed by: NURSE PRACTITIONER

## 2024-07-03 RX ORDER — ONDANSETRON 8 MG/1
8 TABLET, ORALLY DISINTEGRATING ORAL EVERY 6 HOURS PRN
COMMUNITY
Start: 2024-04-25

## 2024-07-03 RX ORDER — HYDROXYZINE PAMOATE 100 MG
100 CAPSULE ORAL 3 TIMES DAILY PRN
COMMUNITY

## 2024-07-03 RX ORDER — LURASIDONE HYDROCHLORIDE 40 MG/1
20 TABLET, FILM COATED ORAL
COMMUNITY

## 2024-07-03 RX ORDER — BUPROPION HCL 300 MG
TABLET, EXTENDED RELEASE 24 HR ORAL
COMMUNITY
Start: 2024-04-23

## 2024-07-03 RX ORDER — ESCITALOPRAM OXALATE 10 MG/1
10 TABLET ORAL DAILY
COMMUNITY

## 2024-07-03 RX ORDER — RIMEGEPANT SULFATE 75 MG/75MG
75 TABLET, ORALLY DISINTEGRATING ORAL PRN
Qty: 8 TABLET | Refills: 2 | Status: SHIPPED | OUTPATIENT
Start: 2024-07-03

## 2024-07-03 NOTE — PROGRESS NOTES
7/3/24    Rustam DASILVA Coreas  1990    Chief Complaint   Patient presents with    Follow-up     6 week follow up for Intractable chronic migraine without aura and without status migrainosus. More like headache pt states.          History of Present Illness  Rustam is a 34 y.o. male presenting today for follow-up of: Chronic migraine with aura, suspected IAN.    He was referred for sleep study to be evaluated for IAN.  For migraine prevention, he was started on Aimovig for preventative therapy and Nurtec for abortive therapy.  Triptans are contraindicated due to his numerous stroke risks and coronary artery disease    Today, he tells me since starting Aimovig he has only had about 8 migraines per month.  He has a mild headache daily, he has not completed his sleep study yet, it is scheduled for August.  He has not tried Nurtec samples yet, his prior authorization is still pending.    Migraine History:  Triggers:     Baseline headache frequency: 28-30/30 days per month  Baseline migraine frequency: 15/30 days per month     Prior Preventative medications tried: topamax (caused pancreatitis), metoprolol, sertraline, cymbalta, gabapentin  Prior abortive medications tried: tylenol, ibuprofen     He says he has a known blockage in his heart, and has HTN.      Poor sleep x decades. Unsure if he snores        Current Outpatient Medications   Medication Sig Dispense Refill    WELLBUTRIN  MG extended release tablet       escitalopram (LEXAPRO) 10 MG tablet Take 1 tablet by mouth daily      CARVEDILOL PO       hydrOXYzine pamoate (VISTARIL) 100 MG capsule Take 1 capsule by mouth 3 times daily as needed      ondansetron (ZOFRAN-ODT) 8 MG TBDP disintegrating tablet Take 1 tablet by mouth every 6 hours as needed      lurasidone (LATUDA) 40 MG TABS tablet Take 0.5 tablets by mouth Daily with supper      rimegepant sulfate (NURTEC) 75 MG TBDP Take 75 mg by mouth as needed (Migraine) Not to exceed more than 1 tab

## 2024-07-26 ENCOUNTER — APPOINTMENT (OUTPATIENT)
Dept: GENERAL RADIOLOGY | Age: 34
End: 2024-07-26
Payer: COMMERCIAL

## 2024-07-26 ENCOUNTER — HOSPITAL ENCOUNTER (EMERGENCY)
Age: 34
Discharge: HOME | End: 2024-07-26
Attending: EMERGENCY MEDICINE
Payer: COMMERCIAL

## 2024-07-26 VITALS
BODY MASS INDEX: 38.5 KG/M2 | SYSTOLIC BLOOD PRESSURE: 139 MMHG | RESPIRATION RATE: 18 BRPM | HEART RATE: 88 BPM | OXYGEN SATURATION: 97 % | HEIGHT: 74 IN | DIASTOLIC BLOOD PRESSURE: 93 MMHG | TEMPERATURE: 97.4 F | WEIGHT: 300 LBS

## 2024-07-26 DIAGNOSIS — S93.402A SPRAIN OF LEFT ANKLE, UNSPECIFIED LIGAMENT, INITIAL ENCOUNTER: ICD-10-CM

## 2024-07-26 DIAGNOSIS — M25.572 ACUTE LEFT ANKLE PAIN: Primary | ICD-10-CM

## 2024-07-26 PROCEDURE — 6370000000 HC RX 637 (ALT 250 FOR IP): Performed by: EMERGENCY MEDICINE

## 2024-07-26 PROCEDURE — 73610 X-RAY EXAM OF ANKLE: CPT

## 2024-07-26 PROCEDURE — 99283 EMERGENCY DEPT VISIT LOW MDM: CPT

## 2024-07-26 RX ORDER — ACETAMINOPHEN 325 MG/1
650 TABLET ORAL EVERY 6 HOURS PRN
Qty: 120 TABLET | Refills: 3 | Status: SHIPPED | OUTPATIENT
Start: 2024-07-26

## 2024-07-26 RX ORDER — NAPROXEN 250 MG/1
500 TABLET ORAL ONCE
Status: COMPLETED | OUTPATIENT
Start: 2024-07-26 | End: 2024-07-26

## 2024-07-26 RX ORDER — NAPROXEN 500 MG/1
500 TABLET ORAL 2 TIMES DAILY
Qty: 60 TABLET | Refills: 0 | Status: SHIPPED | OUTPATIENT
Start: 2024-07-26

## 2024-07-26 RX ADMIN — NAPROXEN 500 MG: 250 TABLET ORAL at 22:16

## 2024-07-26 ASSESSMENT — ENCOUNTER SYMPTOMS
RESPIRATORY NEGATIVE: 1
GASTROINTESTINAL NEGATIVE: 1
ALLERGIC/IMMUNOLOGIC NEGATIVE: 1
EYES NEGATIVE: 1

## 2024-07-27 NOTE — ED PROVIDER NOTES
100 each 5      Allergies   Allergen Reactions    Liraglutide Other (See Comments)    Metformin Diarrhea    Topiramate Er Other (See Comments)    Tape [Adhesive Tape] Rash     And tegaderms     Current Facility-Administered Medications   Medication Dose Route Frequency Provider Last Rate Last Admin    naproxen (NAPROSYN) tablet 500 mg  500 mg Oral Once Bert Hill DO        Erenumab-aooe SOAJ 70 mg  70 mg SubCUTAneous Once Dena Schneider DO         Current Outpatient Medications   Medication Sig Dispense Refill    naproxen (NAPROSYN) 500 MG tablet Take 1 tablet by mouth 2 times daily 60 tablet 0    acetaminophen (TYLENOL) 325 MG tablet Take 2 tablets by mouth every 6 hours as needed for Pain 120 tablet 3    WELLBUTRIN  MG extended release tablet       escitalopram (LEXAPRO) 10 MG tablet Take 1 tablet by mouth daily      CARVEDILOL PO       hydrOXYzine pamoate (VISTARIL) 100 MG capsule Take 1 capsule by mouth 3 times daily as needed      ondansetron (ZOFRAN-ODT) 8 MG TBDP disintegrating tablet Take 1 tablet by mouth every 6 hours as needed      lurasidone (LATUDA) 40 MG TABS tablet Take 0.5 tablets by mouth Daily with supper      rimegepant sulfate (NURTEC) 75 MG TBDP Take 75 mg by mouth as needed (Migraine) Not to exceed more than 1 tab in 24 hours 8 tablet 2    mirtazapine (REMERON) 15 MG tablet Take 1 tablet by mouth nightly      Erenumab-aooe 70 MG/ML SOAJ Lot [id number]  Exp 5/31/2025 1 Adjustable Dose Pre-filled Pen Syringe 0    lisinopril (PRINIVIL;ZESTRIL) 20 MG tablet TAKE ONE TABLET BY MOUTH DAILY 90 tablet 3    cloNIDine (CATAPRES) 0.1 MG tablet TAKE ONE TABLET BY MOUTH TWICE A  tablet 3    dilTIAZem (CARDIZEM CD) 120 MG extended release capsule TAKE ONE CAPSULE BY MOUTH DAILY 90 capsule 3    montelukast (SINGULAIR) 10 MG tablet Take 1 tablet by mouth nightly 90 tablet 1    albuterol sulfate HFA (PROVENTIL;VENTOLIN;PROAIR) 108 (90 Base) MCG/ACT inhaler Inhale 2 puffs into the lungs 4

## 2024-08-30 NOTE — PROGRESS NOTES
joint swelling. Skin: Negative for color change, pallor, rash and wound. Neurological: Positive for numbness. Negative for weakness. Past Medical History:   Diagnosis Date    Depression 5/31/2016    Essential hypertension 5/31/2016    Hypertriglyceridemia 4/25/2017       Objective:   Physical Exam  Constitutional:       Appearance: He is well-developed. HENT:      Head: Normocephalic and atraumatic. Eyes:      Pupils: Pupils are equal, round, and reactive to light. Neck:      Musculoskeletal: Normal range of motion. Pulmonary:      Effort: Pulmonary effort is normal.   Musculoskeletal: Normal range of motion. General: Tenderness present. No deformity. Right shoulder: He exhibits tenderness, crepitus and pain. He exhibits normal range of motion, no bony tenderness, no swelling, no effusion, no deformity, no laceration, no spasm, normal pulse and normal strength. Left shoulder: He exhibits tenderness, crepitus and pain. He exhibits normal range of motion, no bony tenderness, no swelling, no effusion, no deformity, no laceration, no spasm, normal pulse and normal strength. Right elbow: Normal.     Left elbow: Normal.   Skin:     General: Skin is warm and dry. Coloration: Skin is not pale. Findings: No erythema or rash. Neurological:      Mental Status: He is alert and oriented to person, place, and time. Sensory: No sensory deficit. Right shoulder-Skin intact with no erythema, ecchymosis or lacerations present. Full range of motion  Positive Gomez sign  Strength 5/5 to resisted abduction. Left shoulder-Skin intact with no erythema, ecchymosis or lacerations present. Full range of motion  Positive Gomez sign  Strength 5/5 to resisted abduction.     Xr Shoulder Right (min 2 Views)    Result Date: 7/6/2020  XRAY X-ray 3 views of the right shoulder reviewed by me today in the office demonstrates age appropriate bone density throughout with a type I acromion, mild narrowing of the acromioclavicular joint, no subluxation or dislocation noted, no acute osseous abnormalities, no bony prominences, no loose bodies. Impression: Normal right shoulder with no acute process. Xr Shoulder Left (min 2 Views)    Result Date: 7/6/2020  XRAY X-ray 3 views of the left shoulder reviewed by me today in the office demonstrates age appropriate bone density throughout with a type I acromion, mild narrowing of the acromioclavicular joint, no subluxation or dislocation noted, no acute osseous abnormalities, no bony prominences, no loose bodies. Impression: Normal left shoulder with no acute process. Assessment:      Right shoulder impingement  Left shoulder impingement      Plan:      I discussed with him today his response to the cortisone injections for his shoulders. At this point given his persistent symptoms I will order MRIs of the shoulders for further evaluation. Following this we may consider shoulder arthroscopy or if his MRIs are normal we may consider a referral to a cervical spine specialist.    The other option would be to consider carpal tunnel surgery to see if this might help with his symptoms. Continue weight-bearing as tolerated. Continue range of motion exercises as instructed. Ice and elevate as needed. Tylenol or Motrin for pain. Follow up after MRI for further evaluation treatment options.             Rom 97, DO None known

## 2024-10-01 DIAGNOSIS — M79.642 PAIN IN BOTH HANDS: ICD-10-CM

## 2024-10-01 DIAGNOSIS — M79.641 PAIN IN BOTH HANDS: ICD-10-CM

## 2024-10-02 RX ORDER — IBUPROFEN 800 MG/1
800 TABLET, FILM COATED ORAL 2 TIMES DAILY PRN
Qty: 60 TABLET | Refills: 5 | OUTPATIENT
Start: 2024-10-02

## 2025-01-07 ENCOUNTER — TELEPHONE (OUTPATIENT)
Age: 35
End: 2025-01-07

## 2025-01-08 NOTE — TELEPHONE ENCOUNTER
PA approval [employer]:  Outcome  Approved on January 7 by [place] [employer] 2017  Your PA request for [id number] was approved for 180 days. The PA# assigned is [id number].  Authorization Expiration Date: 7/15/2025

## 2025-05-05 ENCOUNTER — HOSPITAL ENCOUNTER (EMERGENCY)
Age: 35
Discharge: HOME | End: 2025-05-05
Attending: EMERGENCY MEDICINE
Payer: COMMERCIAL

## 2025-05-05 ENCOUNTER — APPOINTMENT (OUTPATIENT)
Dept: GENERAL RADIOLOGY | Age: 35
End: 2025-05-05
Payer: COMMERCIAL

## 2025-05-05 VITALS
SYSTOLIC BLOOD PRESSURE: 115 MMHG | OXYGEN SATURATION: 92 % | HEART RATE: 80 BPM | BODY MASS INDEX: 35.94 KG/M2 | DIASTOLIC BLOOD PRESSURE: 69 MMHG | HEIGHT: 74 IN | RESPIRATION RATE: 15 BRPM | TEMPERATURE: 98.1 F | WEIGHT: 280 LBS

## 2025-05-05 DIAGNOSIS — R07.9 ACUTE CHEST PAIN: Primary | ICD-10-CM

## 2025-05-05 LAB
ALBUMIN SERPL-MCNC: 4.4 G/DL (ref 3.4–5)
ALBUMIN/GLOB SERPL: 1.3 {RATIO}
ALP SERPL-CCNC: 115 U/L (ref 40–129)
ALT SERPL-CCNC: 33 U/L (ref 10–40)
ANION GAP SERPL CALCULATED.3IONS-SCNC: 13 MMOL/L (ref 9–17)
AST SERPL-CCNC: 22 U/L (ref 15–37)
BASOPHILS # BLD: 0.05 K/UL
BASOPHILS NFR BLD: 0 % (ref 0–1)
BILIRUB SERPL-MCNC: 0.3 MG/DL (ref 0–1)
BUN SERPL-MCNC: 11 MG/DL (ref 7–20)
CALCIUM SERPL-MCNC: 9.6 MG/DL (ref 8.3–10.6)
CHLORIDE SERPL-SCNC: 100 MMOL/L (ref 99–110)
CO2 SERPL-SCNC: 23 MMOL/L (ref 21–32)
CREAT SERPL-MCNC: 1.1 MG/DL (ref 0.9–1.3)
D DIMER PPP FEU-MCNC: <0.27 UG/ML FEU (ref 0–0.46)
EKG ATRIAL RATE: 84 BPM
EKG ATRIAL RATE: 85 BPM
EKG DIAGNOSIS: NORMAL
EKG DIAGNOSIS: NORMAL
EKG P AXIS: 20 DEGREES
EKG P AXIS: 29 DEGREES
EKG P-R INTERVAL: 148 MS
EKG P-R INTERVAL: 150 MS
EKG Q-T INTERVAL: 358 MS
EKG Q-T INTERVAL: 360 MS
EKG QRS DURATION: 78 MS
EKG QRS DURATION: 88 MS
EKG QTC CALCULATION (BAZETT): 425 MS
EKG QTC CALCULATION (BAZETT): 426 MS
EKG R AXIS: -34 DEGREES
EKG R AXIS: -42 DEGREES
EKG T AXIS: 20 DEGREES
EKG T AXIS: 26 DEGREES
EKG VENTRICULAR RATE: 84 BPM
EKG VENTRICULAR RATE: 85 BPM
EOSINOPHIL # BLD: 0.1 K/UL
EOSINOPHILS RELATIVE PERCENT: 1 % (ref 0–3)
ERYTHROCYTE [DISTWIDTH] IN BLOOD BY AUTOMATED COUNT: 13.2 % (ref 11.7–14.9)
GFR, ESTIMATED: >90 ML/MIN/1.73M2
GLUCOSE SERPL-MCNC: 173 MG/DL (ref 74–99)
HCT VFR BLD AUTO: 48.7 % (ref 42–52)
HGB BLD-MCNC: 17.2 G/DL (ref 13.5–18)
IMM GRANULOCYTES # BLD AUTO: 0.06 K/UL
IMM GRANULOCYTES NFR BLD: 0 %
LYMPHOCYTES NFR BLD: 1.71 K/UL
LYMPHOCYTES RELATIVE PERCENT: 12 % (ref 24–44)
MAGNESIUM SERPL-MCNC: 1.9 MG/DL (ref 1.8–2.4)
MCH RBC QN AUTO: 30.2 PG (ref 27–31)
MCHC RBC AUTO-ENTMCNC: 35.3 G/DL (ref 32–36)
MCV RBC AUTO: 85.4 FL (ref 78–100)
MONOCYTES NFR BLD: 0.99 K/UL
MONOCYTES NFR BLD: 7 % (ref 0–5)
NEUTROPHILS NFR BLD: 80 % (ref 36–66)
NEUTS SEG NFR BLD: 11.52 K/UL
PLATELET # BLD AUTO: 218 K/UL (ref 140–440)
PMV BLD AUTO: 10.7 FL (ref 7.5–11.1)
POTASSIUM SERPL-SCNC: 4.3 MMOL/L (ref 3.5–5.1)
PROT SERPL-MCNC: 7.8 G/DL (ref 6.4–8.2)
RBC # BLD AUTO: 5.7 M/UL (ref 4.6–6.2)
SODIUM SERPL-SCNC: 136 MMOL/L (ref 136–145)
TROPONIN I SERPL HS-MCNC: <6 NG/L (ref 0–22)
TROPONIN I SERPL HS-MCNC: <6 NG/L (ref 0–22)
WBC OTHER # BLD: 14.4 K/UL (ref 4–10.5)

## 2025-05-05 PROCEDURE — 85379 FIBRIN DEGRADATION QUANT: CPT

## 2025-05-05 PROCEDURE — 93010 ELECTROCARDIOGRAM REPORT: CPT | Performed by: INTERNAL MEDICINE

## 2025-05-05 PROCEDURE — 6370000000 HC RX 637 (ALT 250 FOR IP): Performed by: EMERGENCY MEDICINE

## 2025-05-05 PROCEDURE — 93005 ELECTROCARDIOGRAM TRACING: CPT | Performed by: EMERGENCY MEDICINE

## 2025-05-05 PROCEDURE — 71046 X-RAY EXAM CHEST 2 VIEWS: CPT

## 2025-05-05 PROCEDURE — 80053 COMPREHEN METABOLIC PANEL: CPT

## 2025-05-05 PROCEDURE — 85025 COMPLETE CBC W/AUTO DIFF WBC: CPT

## 2025-05-05 PROCEDURE — 84484 ASSAY OF TROPONIN QUANT: CPT

## 2025-05-05 PROCEDURE — 83735 ASSAY OF MAGNESIUM: CPT

## 2025-05-05 PROCEDURE — 99285 EMERGENCY DEPT VISIT HI MDM: CPT

## 2025-05-05 RX ORDER — ASPIRIN 325 MG
325 TABLET ORAL ONCE
Status: COMPLETED | OUTPATIENT
Start: 2025-05-05 | End: 2025-05-05

## 2025-05-05 RX ADMIN — ASPIRIN 325 MG: 325 TABLET ORAL at 15:16

## 2025-05-05 ASSESSMENT — LIFESTYLE VARIABLES
HOW OFTEN DO YOU HAVE A DRINK CONTAINING ALCOHOL: NEVER
HOW MANY STANDARD DRINKS CONTAINING ALCOHOL DO YOU HAVE ON A TYPICAL DAY: PATIENT DOES NOT DRINK

## 2025-05-05 ASSESSMENT — PAIN SCALES - GENERAL
PAINLEVEL_OUTOF10: 6

## 2025-05-05 ASSESSMENT — PAIN - FUNCTIONAL ASSESSMENT: PAIN_FUNCTIONAL_ASSESSMENT: 0-10

## 2025-05-05 ASSESSMENT — HEART SCORE: ECG: NORMAL

## 2025-05-05 NOTE — ED PROVIDER NOTES
radiograph was interpreted by myself in the absence of a radiologist:  [x] Radiologist's Report reviewed at time of ED visit:  XR CHEST (2 VW)  Result Date: 2025  PROCEDURE: XR CHEST (2 VW) DATE OF EXAM:  2025 14:21 DEMOGRAPHICS: 35 years old Male INDICATION: Chest pain COMPARISON: No existing relevant imaging study corresponding to the same anatomical region is available. TECHNIQUE: PA and lateral views of the chest were obtained. FINDINGS: Full inspiration. No pleural effusion, pneumothorax, or focal consolidation.  Cardiac and mediastinal contours are within normal limits. No acute osseous abnormality. IMPRESSION: No evidence for acute airspace disease  Dictated and Electronically Signed By: Mehrdad Barber MD 2025 14:56            CC/HPI Summary, DDx, ED Course, and Reassessment:     Patient presents with 1 week of intermittent chest pain, somewhat pleuritic in nature.  He is hemodynamically stable and nontoxic on arrival.  Labs and imaging obtained to evaluate further.  With negative D-dimer, no further workup undertaken for PE.  Patient is noted to have a leukocytosis of 14.4.  However he denies any infectious symptoms here chest x-ray is clear of effusion or infiltrate.    Remaining labs notable for elevated glucose but no elevation of the anion gap to suggest DKA.  H&H within normal limits.    Troponin x 2 is undetected.  Some consideration for ACS given patient's risk factors.  However heart score is 3.  Based on this, I think he is appropriate for further workup on an outpatient basis and he is comfortable with this.       History: 1  EC  Patient Age: 2  Risk Factors: 2  Troponin: 0  Heart Score Total: 3      History from : Patient    Patient was given the following medications:  Medications   aspirin tablet 325 mg (325 mg Oral Given 25 1516)       EKG (if obtained): (All EKG's are interpreted by myself in the absence of a cardiologist) sinus rhythm at 85.  Left axis deviation with poor R

## 2025-08-17 ENCOUNTER — APPOINTMENT (OUTPATIENT)
Dept: CT IMAGING | Age: 35
End: 2025-08-17
Payer: COMMERCIAL

## 2025-08-17 ENCOUNTER — HOSPITAL ENCOUNTER (EMERGENCY)
Age: 35
Discharge: HOME | End: 2025-08-17
Attending: EMERGENCY MEDICINE
Payer: COMMERCIAL

## 2025-08-17 VITALS
BODY MASS INDEX: 36.57 KG/M2 | OXYGEN SATURATION: 96 % | HEART RATE: 86 BPM | HEIGHT: 74 IN | SYSTOLIC BLOOD PRESSURE: 147 MMHG | RESPIRATION RATE: 18 BRPM | WEIGHT: 285 LBS | TEMPERATURE: 98.2 F | DIASTOLIC BLOOD PRESSURE: 76 MMHG

## 2025-08-17 DIAGNOSIS — L03.211 CELLULITIS OF FACE: ICD-10-CM

## 2025-08-17 DIAGNOSIS — L02.01 FACIAL ABSCESS: Primary | ICD-10-CM

## 2025-08-17 LAB
ALBUMIN SERPL-MCNC: 4.3 G/DL (ref 3.4–5)
ALBUMIN/GLOB SERPL: 1.4 {RATIO}
ALP SERPL-CCNC: 105 U/L (ref 40–129)
ALT SERPL-CCNC: 22 U/L (ref 10–40)
ANION GAP SERPL CALCULATED.3IONS-SCNC: 15 MMOL/L (ref 9–17)
AST SERPL-CCNC: 16 U/L (ref 15–37)
BASOPHILS # BLD: 0.09 K/UL
BASOPHILS NFR BLD: 1 % (ref 0–1)
BILIRUB SERPL-MCNC: 0.4 MG/DL (ref 0–1)
BUN SERPL-MCNC: 11 MG/DL (ref 7–20)
CALCIUM SERPL-MCNC: 8.6 MG/DL (ref 8.3–10.6)
CHLORIDE SERPL-SCNC: 101 MMOL/L (ref 99–110)
CO2 SERPL-SCNC: 21 MMOL/L (ref 21–32)
CREAT SERPL-MCNC: 1.1 MG/DL (ref 0.9–1.3)
EOSINOPHIL # BLD: 0.16 K/UL
EOSINOPHILS RELATIVE PERCENT: 1 % (ref 0–3)
ERYTHROCYTE [DISTWIDTH] IN BLOOD BY AUTOMATED COUNT: 12.5 % (ref 11.7–14.9)
GFR, ESTIMATED: >90 ML/MIN/1.73M2
GLUCOSE SERPL-MCNC: 150 MG/DL (ref 74–99)
HCT VFR BLD AUTO: 48.1 % (ref 42–52)
HGB BLD-MCNC: 17.3 G/DL (ref 13.5–18)
IMM GRANULOCYTES # BLD AUTO: 0.04 K/UL
IMM GRANULOCYTES NFR BLD: 0 %
LYMPHOCYTES NFR BLD: 1.85 K/UL
LYMPHOCYTES RELATIVE PERCENT: 14 % (ref 24–44)
MCH RBC QN AUTO: 30.4 PG (ref 27–31)
MCHC RBC AUTO-ENTMCNC: 36 G/DL (ref 32–36)
MCV RBC AUTO: 84.5 FL (ref 78–100)
MONOCYTES NFR BLD: 0.96 K/UL
MONOCYTES NFR BLD: 7 % (ref 0–5)
NEUTROPHILS NFR BLD: 76 % (ref 36–66)
NEUTS SEG NFR BLD: 10.02 K/UL
PLATELET # BLD AUTO: 215 K/UL (ref 140–440)
PMV BLD AUTO: 10.6 FL (ref 7.5–11.1)
POTASSIUM SERPL-SCNC: 4.2 MMOL/L (ref 3.5–5.1)
PROT SERPL-MCNC: 7.4 G/DL (ref 6.4–8.2)
RBC # BLD AUTO: 5.69 M/UL (ref 4.6–6.2)
SODIUM SERPL-SCNC: 137 MMOL/L (ref 136–145)
WBC OTHER # BLD: 13.1 K/UL (ref 4–10.5)

## 2025-08-17 PROCEDURE — 6360000004 HC RX CONTRAST MEDICATION: Performed by: EMERGENCY MEDICINE

## 2025-08-17 PROCEDURE — 10060 I&D ABSCESS SIMPLE/SINGLE: CPT

## 2025-08-17 PROCEDURE — 70491 CT SOFT TISSUE NECK W/DYE: CPT

## 2025-08-17 PROCEDURE — 99285 EMERGENCY DEPT VISIT HI MDM: CPT

## 2025-08-17 PROCEDURE — 6370000000 HC RX 637 (ALT 250 FOR IP): Performed by: EMERGENCY MEDICINE

## 2025-08-17 PROCEDURE — 80053 COMPREHEN METABOLIC PANEL: CPT

## 2025-08-17 PROCEDURE — 85025 COMPLETE CBC W/AUTO DIFF WBC: CPT

## 2025-08-17 RX ORDER — IOPAMIDOL 755 MG/ML
75 INJECTION, SOLUTION INTRAVASCULAR
Status: COMPLETED | OUTPATIENT
Start: 2025-08-17 | End: 2025-08-17

## 2025-08-17 RX ORDER — L.ACID/L.CASEI/B.BIF/B.LON/FOS 2B CELL-50
1 CAPSULE ORAL DAILY
Qty: 10 CAPSULE | Refills: 0 | Status: SHIPPED | OUTPATIENT
Start: 2025-08-17 | End: 2025-08-27

## 2025-08-17 RX ORDER — CLINDAMYCIN HYDROCHLORIDE 300 MG/1
300 CAPSULE ORAL 4 TIMES DAILY
Qty: 40 CAPSULE | Refills: 0 | Status: SHIPPED | OUTPATIENT
Start: 2025-08-17 | End: 2025-08-27

## 2025-08-17 RX ORDER — IBUPROFEN 600 MG/1
600 TABLET, FILM COATED ORAL 3 TIMES DAILY PRN
Qty: 30 TABLET | Refills: 0 | Status: SHIPPED | OUTPATIENT
Start: 2025-08-17

## 2025-08-17 RX ADMIN — IOPAMIDOL 75 ML: 755 INJECTION, SOLUTION INTRAVENOUS at 17:58

## 2025-08-17 RX ADMIN — Medication 3 ML: at 18:19

## 2025-08-17 ASSESSMENT — PAIN - FUNCTIONAL ASSESSMENT: PAIN_FUNCTIONAL_ASSESSMENT: 0-10

## 2025-08-17 ASSESSMENT — PAIN SCALES - GENERAL: PAINLEVEL_OUTOF10: 0
